# Patient Record
Sex: MALE | Race: WHITE | NOT HISPANIC OR LATINO | Employment: FULL TIME | URBAN - METROPOLITAN AREA
[De-identification: names, ages, dates, MRNs, and addresses within clinical notes are randomized per-mention and may not be internally consistent; named-entity substitution may affect disease eponyms.]

---

## 2017-02-21 ENCOUNTER — HOSPITAL ENCOUNTER (EMERGENCY)
Facility: CLINIC | Age: 22
Discharge: HOME | End: 2017-02-21
Attending: EMERGENCY MEDICINE | Admitting: EMERGENCY MEDICINE
Payer: COMMERCIAL

## 2017-02-21 VITALS
SYSTOLIC BLOOD PRESSURE: 106 MMHG | RESPIRATION RATE: 18 BRPM | BODY MASS INDEX: 24.05 KG/M2 | HEART RATE: 92 BPM | DIASTOLIC BLOOD PRESSURE: 73 MMHG | HEIGHT: 70 IN | OXYGEN SATURATION: 98 % | TEMPERATURE: 98.1 F | WEIGHT: 168 LBS

## 2017-02-21 DIAGNOSIS — J02.0 ACUTE STREPTOCOCCAL PHARYNGITIS: ICD-10-CM

## 2017-02-21 LAB
INTERNAL QC OK POCT: YES
S PYO AG THROAT QL IA.RAPID: POSITIVE

## 2017-02-21 PROCEDURE — 87880 STREP A ASSAY W/OPTIC: CPT | Performed by: EMERGENCY MEDICINE

## 2017-02-21 PROCEDURE — 99213 OFFICE O/P EST LOW 20 MIN: CPT | Performed by: EMERGENCY MEDICINE

## 2017-02-21 PROCEDURE — 99213 OFFICE O/P EST LOW 20 MIN: CPT

## 2017-02-21 RX ORDER — AMOXICILLIN 500 MG/1
1000 CAPSULE ORAL 2 TIMES DAILY
Qty: 40 CAPSULE | Refills: 0 | Status: SHIPPED | OUTPATIENT
Start: 2017-02-21 | End: 2017-03-03

## 2017-02-21 NOTE — ED PROVIDER NOTES
History     Chief Complaint   Patient presents with     Pharyngitis     HPI  Anthony Edwards is a 21 year old male who presents with 4 days of sore throat.  Patient also states one week ago he had influenza-like symptoms but had improvement with this.  His significant other also had similar symptoms.  He now presents with moderate sore throat.  He has some nasal congestion.  Denies ear pain.  He has a cough which is intermittent productive.  No chest pain or shortness of breath.  No abdominal pain, nausea or vomiting.  No treatment prior to arrival.  Former smoker.    I have reviewed the Medications, Allergies, Past Medical and Surgical History, and Social History in the Epic system.    Review of Systems all other systems reviewed and are negative  History reviewed. No pertinent past medical history.  Patient Active Problem List   Diagnosis     Atopic rhinitis     Attention deficit hyperactivity disorder (ADHD), combined type     Major depressive disorder, single episode, mild (H)     Stimulant dependence (H)     No current facility-administered medications for this encounter.      Current Outpatient Prescriptions   Medication     amoxicillin (AMOXIL) 500 MG capsule     citalopram (CELEXA) 20 MG tablet     ADDERALL XR 30 MG per 24 hr capsule     albuterol (PROAIR HFA, PROVENTIL HFA, VENTOLIN HFA) 108 (90 BASE) MCG/ACT inhaler     predniSONE (DELTASONE) 20 MG tablet     diclofenac (VOLTAREN) 50 MG EC tablet     cyclobenzaprine (FLEXERIL) 10 MG tablet     ibuprofen (ADVIL,MOTRIN) 200 MG tablet     fluticasone (FLONASE) 50 MCG/ACT nasal spray     olopatadine (PATANOL) 0.1 % ophthalmic solution     loratadine (CLARITIN) 10 MG tablet     montelukast (SINGULAIR) 10 MG tablet      No Known Allergies  Social History     Social History     Marital status: Single     Spouse name: N/A     Number of children: N/A     Years of education: N/A     Occupational History     Not on file.     Social History Main Topics      "Smoking status: Former Smoker     Smokeless tobacco: Not on file     Alcohol use No     Drug use: No     Sexual activity: Not on file     Other Topics Concern     Not on file     Social History Narrative     No family history on file.        Physical Exam   BP: 106/73  Pulse: 92  Temp: 98.1  F (36.7  C)  Resp: 18  Height: 177.8 cm (5' 10\")  Weight: 76.2 kg (168 lb)  SpO2: 98 %  Physical Exam Gen. alert cooperative male in mild to moderate chest.  HEENT reveals a right ear effusion but no infection.  Left TM is normal.  Nasal passages are swollen near occlusion with clear discharge.  Oral he has tonsillar hypertrophy without exudate.  Neck is supple with tender right anterior node.  No stridor.  Lungs were clear without adventitious sounds.  No skin rashes.    ED Course     ED Course     Procedures             Critical Care time:  none               Labs Ordered and Resulted from Time of ED Arrival Up to the Time of Departure from the ED - No data to display  Rapid strep is ordered.  Assessments & Plan (with Medical Decision Making)   Patient is 21-year-old male presents with 4 days of sore throat.  No significant congestion and cough.  Recent influenza-like illness.  On exam patient was afebrile and vitals stable.  Not hypoxic.  He did have symmetrical tonsillar hypertrophy or exudate.  Tender anterior node on the right.  No stridor.  No adventitious lung sounds.  A strep test was positive.  Patient tried amoxicillin for 10 days.  Handout on strep pharyngitis is provided.  Reasons to return for reassessment discussed.  I have reviewed the nursing notes.    I have reviewed the findings, diagnosis, plan and need for follow up with the patient.    New Prescriptions    AMOXICILLIN (AMOXIL) 500 MG CAPSULE    Take 2 capsules (1,000 mg) by mouth 2 times daily for 10 days       Final diagnoses:   Acute streptococcal pharyngitis       2/21/2017   [facility] EMERGENCY DEPARTMENT     Tim Erickson MD  02/21/17 1407    "

## 2017-02-21 NOTE — ED AVS SNAPSHOT
[facility] Emergency Department    [address] [address]    Phone:  [phone]    Fax:  [phone]                                       Anthony Edwards   MRN: [id number]    Department:  [facility] Emergency Department   Date of Visit:  2/21/2017           After Visit Summary Signature Page     I have received my discharge instructions, and my questions have been answered. I have discussed any challenges I see with this plan with the nurse or doctor.    ..........................................................................................................................................  Patient/Patient Representative Signature      ..........................................................................................................................................  Patient Representative Print Name and Relationship to Patient    ..................................................               ................................................  Date                                            Time    ..........................................................................................................................................  Reviewed by Signature/Title    ...................................................              ..............................................  Date                                                            Time

## 2017-02-21 NOTE — ED AVS SNAPSHOT
[facility] Emergency Department    [address]    [place] [address]    Phone:  [phone]    Fax:  [phone]                                       Anthony Edwards   MRN: [id number]    Department:  [facility] Emergency Department   Date of Visit:  2/21/2017           Patient Information     Date Of Birth          1995        Your diagnoses for this visit were:     Acute streptococcal pharyngitis        You were seen by Tim Erickson MD.      Follow-up Information     Follow up with Orville Eubanks PA-C.    Specialty:  Physician Assistant    Why:  As needed    Contact information:    [facility]  [address] [facility] [address]  [phone]          Discharge Instructions         Pharyngitis: Strep (Confirmed)     You have had a positive test for strep throat. Strep throat is a contagious illness. It is spread by coughing, kissing or by touching others after touching your mouth or nose. Symptoms include throat pain which is worse with swallowing, aching all over, headache and fever. It is treated with antibiotic medication. This should help you start to feel better within 1-2 days.  Home care    Rest at home. Drink plenty of fluids to avoid dehydration.    No work or school for the first 2 days of taking the antibiotics. After this time, you will not be contagious. You can then return to school or work if you are feeling better.     The antibiotic medication must be taken for the full 10 days, even if you feel better. This is very important to ensure the infection is treated. It is also important to prevent drug-resistent organisms from developing. If you were given an antibiotic shot, no more antibiotics are needed.    You may use acetaminophen (Tylenol) or ibuprofen (Motrin, Advil) to control pain or fever, unless another medicine was prescribed for this. (NOTE: If you have chronic liver or kidney disease or ever had a stomach ulcer or GI bleeding, talk with your  doctor before using these medicines.)    Throat lozenges or sprays (such as Chloraseptic) help reduce pain. Gargling with warm salt water will also reduce throat pain. Dissolve 1/2 teaspoon of salt in 1 glass of warm water. This may be useful just before meals.     Soft foods are okay. Avoid salty or spicy foods.  Follow-up care  Follow up with your healthcare provider or our staff if you are not improving over the next week.  When to seek medical advice  Call your healthcare provider right away if any of these occur:    Fever as directed by your doctor     New or worsening ear pain, sinus pain, or headache    Painful lumps in the back of neck    Stiff neck    Lymph nodes are getting larger or becoming soft in the middle    Inability to swallow liquids, excessive drooling, or inability to open mouth wide due to throat pain    Signs of dehydration (very dark urine or no urine, sunken eyes, dizziness)    Trouble breathing or noisy breathing    Muffled voice    New rash    3066-9091 [employer]. [address] [place] [address]. All rights reserved. This information is not intended as a substitute for professional medical care. Always follow your healthcare professional's instructions.          Future Appointments        Provider Department Dept Phone Center    2/23/2017 10:40 AM Orville Eubanks PA-C [facility] [phone] [facility]      24 Hour Appointment Hotline       To make an appointment at any [facility], call [phone] ([phone]). If you don't have a family doctor or clinic, we will help you find one. [facility] are conveniently located to serve the needs of you and your family.             Review of your medicines      START taking        Dose / Directions Last dose taken    amoxicillin 500 MG capsule   Commonly known as:  AMOXIL   Dose:  1000 mg   Quantity:  40 capsule        Take 2 capsules (1,000 mg) by mouth 2 times daily for 10 days   Refills:  0           Our records show that you are taking the medicines listed below. If these are incorrect, please call your family doctor or clinic.        Dose / Directions Last dose taken    ADDERALL XR 30 MG per 24 hr capsule   Dose:  30 mg   Quantity:  30 capsule   Generic drug:  amphetamine-dextroamphetamine        Take 1 capsule (30 mg) by mouth daily Need appointment for further refills   Refills:  0        albuterol 108 (90 BASE) MCG/ACT Inhaler   Commonly known as:  PROAIR HFA/PROVENTIL HFA/VENTOLIN HFA   Dose:  2 puff   Quantity:  1 Inhaler        Inhale 2 puffs into the lungs every 6 hours as needed for shortness of breath / dyspnea or wheezing   Refills:  0        citalopram 20 MG tablet   Commonly known as:  celeXA   Dose:  20 mg   Quantity:  30 tablet        Take 1 tablet (20 mg) by mouth daily   Refills:  1        cyclobenzaprine 10 MG tablet   Commonly known as:  FLEXERIL   Dose:  10 mg   Quantity:  60 tablet        Take 1 tablet (10 mg) by mouth 3 times daily as needed for muscle spasms   Refills:  1        diclofenac 50 MG EC tablet   Commonly known as:  VOLTAREN   Dose:  50 mg   Quantity:  90 tablet        Take 1 tablet (50 mg) by mouth 3 times daily as needed for moderate pain   Refills:  1        fluticasone 50 MCG/ACT spray   Commonly known as:  FLONASE   Dose:  2 spray   Quantity:  16 g        Spray 2 sprays in nostril daily   Refills:  11        ibuprofen 200 MG tablet   Commonly known as:  ADVIL/MOTRIN   Dose:  400 mg        Take 400 mg by mouth   Refills:  0        loratadine 10 MG tablet   Commonly known as:  CLARITIN   Dose:  10 mg   Quantity:  30 tablet        Take 1 tablet (10 mg) by mouth daily   Refills:  11        montelukast 10 MG tablet   Commonly known as:  SINGULAIR   Dose:  10 mg   Quantity:  30 tablet        Take 1 tablet (10 mg) by mouth At Bedtime   Refills:  11        olopatadine 0.1 % ophthalmic solution   Commonly known as:  PATANOL   Dose:  1 drop   Quantity:  1 Bottle         "Place 1 drop into both eyes 2 times daily   Refills:  11        predniSONE 20 MG tablet   Commonly known as:  DELTASONE   Quantity:  10 tablet        Take one tablet twice  Day for 5 days   Refills:  0                Prescriptions were sent or printed at these locations (1 Prescription)                   [facility] [address] [place] [address] - [address] [facility]   [address] [facility] [address]    Telephone:  [phone]   Fax:  [phone]   Hours:                  E-Prescribed (1 of 1)         amoxicillin (AMOXIL) 500 MG capsule                Orders Needing Specimen Collection     None      Pending Results     No orders found from 2017 to 2017.            Pending Culture Results     No orders found from 2017 to 2017.             Test Results from your hospital stay            Thank you for choosing [facility]       Thank you for choosing [facility] for your care. Our goal is always to provide you with excellent care. Hearing back from our patients is one way we can continue to improve our services. Please take a few minutes to complete the written survey that you may receive in the mail after you visit with us. Thank you!        [facility] Information     [facility] lets you send messages to your doctor, view your test results, renew your prescriptions, schedule appointments and more. To sign up, go to [email or web address] . Click on \"Log in\" on the left side of the screen, which will take you to the Welcome page. Then click on \"Sign up Now\" on the right side of the page.     You will be asked to enter the access code listed below, as well as some personal information. Please follow the directions to create your username and password.     Your access code is: [email or web address]  Expires: 2017  2:07 PM     Your access code will  in 90 days. If you need help or a new code, please call your [facility] clinic or [phone].        Care EveryWhere ID     This is your Care EveryWhere " ID. This could be used by other organizations to access your [place] medical records  [id number]        After Visit Summary       This is your record. Keep this with you and show to your community pharmacist(s) and doctor(s) at your next visit.

## 2017-02-21 NOTE — DISCHARGE INSTRUCTIONS
Pharyngitis: Strep (Confirmed)     You have had a positive test for strep throat. Strep throat is a contagious illness. It is spread by coughing, kissing or by touching others after touching your mouth or nose. Symptoms include throat pain which is worse with swallowing, aching all over, headache and fever. It is treated with antibiotic medication. This should help you start to feel better within 1-2 days.  Home care    Rest at home. Drink plenty of fluids to avoid dehydration.    No work or school for the first 2 days of taking the antibiotics. After this time, you will not be contagious. You can then return to school or work if you are feeling better.     The antibiotic medication must be taken for the full 10 days, even if you feel better. This is very important to ensure the infection is treated. It is also important to prevent drug-resistent organisms from developing. If you were given an antibiotic shot, no more antibiotics are needed.    You may use acetaminophen (Tylenol) or ibuprofen (Motrin, Advil) to control pain or fever, unless another medicine was prescribed for this. (NOTE: If you have chronic liver or kidney disease or ever had a stomach ulcer or GI bleeding, talk with your doctor before using these medicines.)    Throat lozenges or sprays (such as Chloraseptic) help reduce pain. Gargling with warm salt water will also reduce throat pain. Dissolve 1/2 teaspoon of salt in 1 glass of warm water. This may be useful just before meals.     Soft foods are okay. Avoid salty or spicy foods.  Follow-up care  Follow up with your healthcare provider or our staff if you are not improving over the next week.  When to seek medical advice  Call your healthcare provider right away if any of these occur:    Fever as directed by your doctor     New or worsening ear pain, sinus pain, or headache    Painful lumps in the back of neck    Stiff neck    Lymph nodes are getting larger or becoming soft in the  middle    Inability to swallow liquids, excessive drooling, or inability to open mouth wide due to throat pain    Signs of dehydration (very dark urine or no urine, sunken eyes, dizziness)    Trouble breathing or noisy breathing    Muffled voice    New rash    7039-2795 [employer]. [address] [place] [address]. All rights reserved. This information is not intended as a substitute for professional medical care. Always follow your healthcare professional's instructions.

## 2017-02-27 ENCOUNTER — OFFICE VISIT (OUTPATIENT)
Dept: FAMILY MEDICINE | Facility: CLINIC | Age: 22
End: 2017-02-27
Payer: COMMERCIAL

## 2017-02-27 VITALS
BODY MASS INDEX: 23.68 KG/M2 | TEMPERATURE: 97.7 F | SYSTOLIC BLOOD PRESSURE: 110 MMHG | WEIGHT: 165 LBS | DIASTOLIC BLOOD PRESSURE: 66 MMHG | HEART RATE: 72 BPM

## 2017-02-27 DIAGNOSIS — F90.2 ATTENTION DEFICIT HYPERACTIVITY DISORDER (ADHD), COMBINED TYPE: ICD-10-CM

## 2017-02-27 DIAGNOSIS — F32.0 MAJOR DEPRESSIVE DISORDER, SINGLE EPISODE, MILD (H): ICD-10-CM

## 2017-02-27 DIAGNOSIS — G44.209 TENSION HEADACHE: Primary | ICD-10-CM

## 2017-02-27 PROCEDURE — 99214 OFFICE O/P EST MOD 30 MIN: CPT | Performed by: PHYSICIAN ASSISTANT

## 2017-02-27 RX ORDER — CITALOPRAM HYDROBROMIDE 20 MG/1
20 TABLET ORAL DAILY
Qty: 90 TABLET | Refills: 3 | Status: SHIPPED | OUTPATIENT
Start: 2017-02-27 | End: 2019-04-03

## 2017-02-27 RX ORDER — KETOROLAC TROMETHAMINE 10 MG/1
10 TABLET, FILM COATED ORAL EVERY 6 HOURS PRN
Qty: 20 TABLET | Refills: 0 | Status: SHIPPED | OUTPATIENT
Start: 2017-02-27 | End: 2017-06-05

## 2017-02-27 RX ORDER — DEXTROAMPHETAMINE SULFATE, DEXTROAMPHETAMINE SACCHARATE, AMPHETAMINE SULFATE AND AMPHETAMINE ASPARTATE 7.5; 7.5; 7.5; 7.5 MG/1; MG/1; MG/1; MG/1
30 CAPSULE, EXTENDED RELEASE ORAL DAILY
Qty: 30 CAPSULE | Refills: 0 | Status: SHIPPED | OUTPATIENT
Start: 2017-02-27 | End: 2017-02-27

## 2017-02-27 RX ORDER — DEXTROAMPHETAMINE SULFATE, DEXTROAMPHETAMINE SACCHARATE, AMPHETAMINE SULFATE AND AMPHETAMINE ASPARTATE 7.5; 7.5; 7.5; 7.5 MG/1; MG/1; MG/1; MG/1
30 CAPSULE, EXTENDED RELEASE ORAL DAILY
Qty: 30 CAPSULE | Refills: 0 | Status: SHIPPED | OUTPATIENT
Start: 2017-03-28 | End: 2017-02-27

## 2017-02-27 RX ORDER — DEXTROAMPHETAMINE SULFATE, DEXTROAMPHETAMINE SACCHARATE, AMPHETAMINE SULFATE AND AMPHETAMINE ASPARTATE 7.5; 7.5; 7.5; 7.5 MG/1; MG/1; MG/1; MG/1
30 CAPSULE, EXTENDED RELEASE ORAL DAILY
Qty: 30 CAPSULE | Refills: 0 | Status: SHIPPED | OUTPATIENT
Start: 2017-04-27 | End: 2017-06-05

## 2017-02-27 ASSESSMENT — ENCOUNTER SYMPTOMS
DIZZINESS: 0
EYE DISCHARGE: 0
HEADACHES: 1
BACK PAIN: 0
SORE THROAT: 0
MYALGIAS: 0
COUGH: 0
INSOMNIA: 0
NAUSEA: 0
FREQUENCY: 0
EYE PAIN: 0
HEMOPTYSIS: 0
DIARRHEA: 0
BLURRED VISION: 0
DYSURIA: 0
CONSTIPATION: 0
FOCAL WEAKNESS: 0
BLOOD IN STOOL: 0
DEPRESSION: 1
VOMITING: 0
WEAKNESS: 0
DOUBLE VISION: 0
FEVER: 0
WEIGHT LOSS: 0
HALLUCINATIONS: 0
PALPITATIONS: 0
SPUTUM PRODUCTION: 0
LOSS OF CONSCIOUSNESS: 0
NECK PAIN: 0
ABDOMINAL PAIN: 0
SENSORY CHANGE: 0
HEARTBURN: 0
SEIZURES: 0
NERVOUS/ANXIOUS: 1
DIAPHORESIS: 0
EYE REDNESS: 0
SHORTNESS OF BREATH: 0
TINGLING: 0
WHEEZING: 0
PHOTOPHOBIA: 0
ORTHOPNEA: 0

## 2017-02-27 ASSESSMENT — LIFESTYLE VARIABLES: SUBSTANCE_ABUSE: 0

## 2017-02-27 NOTE — PROGRESS NOTES
HPI      SUBJECTIVE:                                                    Anthony Edwards is a 21 year old male who presents to clinic today for follow-up of his depression and anxiety as well as. He is doing very well on his current medication and having no side effects or problems. He has recently had headaches but is also recovering from strep throat.      Medication Followup of Adderall    Taking Medication as prescribed: yes    Side Effects:  None    Medication Helping Symptoms:  yes       Depression Followup    Status since last visit: Stable on current dose    See PHQ-9 for current symptoms.  Other associated symptoms: None    Complicating factors:   Significant life event:  No   Current substance abuse:  None  Anxiety or Panic symptoms:  No    PHQ-9  English PHQ-9   Any Language            Problem list and histories reviewed & adjusted, as indicated.  Additional history: as documented    Patient Active Problem List   Diagnosis     Atopic rhinitis     Attention deficit hyperactivity disorder (ADHD), combined type     Major depressive disorder, single episode, mild (H)     Stimulant dependence (H)     History reviewed. No pertinent past surgical history.    Social History   Substance Use Topics     Smoking status: Current Some Day Smoker     Smokeless tobacco: Not on file     Alcohol use No     History reviewed. No pertinent family history.      Current Outpatient Prescriptions   Medication Sig Dispense Refill     citalopram (CELEXA) 20 MG tablet Take 1 tablet (20 mg) by mouth daily 90 tablet 3     [START ON 4/27/2017] ADDERALL XR 30 MG per 24 hr capsule Take 1 capsule (30 mg) by mouth daily 30 capsule 0     ketorolac (TORADOL) 10 MG tablet Take 1 tablet (10 mg) by mouth every 6 hours as needed 20 tablet 0     amoxicillin (AMOXIL) 500 MG capsule Take 2 capsules (1,000 mg) by mouth 2 times daily for 10 days 40 capsule 0     albuterol (PROAIR HFA, PROVENTIL HFA, VENTOLIN HFA) 108 (90 BASE) MCG/ACT inhaler  Inhale 2 puffs into the lungs every 6 hours as needed for shortness of breath / dyspnea or wheezing 1 Inhaler 0     cyclobenzaprine (FLEXERIL) 10 MG tablet Take 1 tablet (10 mg) by mouth 3 times daily as needed for muscle spasms 60 tablet 1     ibuprofen (ADVIL,MOTRIN) 200 MG tablet Take 400 mg by mouth       fluticasone (FLONASE) 50 MCG/ACT nasal spray Spray 2 sprays in nostril daily 16 g 11     olopatadine (PATANOL) 0.1 % ophthalmic solution Place 1 drop into both eyes 2 times daily 1 Bottle 11     loratadine (CLARITIN) 10 MG tablet Take 1 tablet (10 mg) by mouth daily 30 tablet 11     montelukast (SINGULAIR) 10 MG tablet Take 1 tablet (10 mg) by mouth At Bedtime 30 tablet 11     [DISCONTINUED] citalopram (CELEXA) 20 MG tablet Take 1 tablet (20 mg) by mouth daily 30 tablet 1     No Known Allergies  Problem list, Medication list, Allergies, and Medical/Social/Surgical histories [facility] and updated as appropriate.          Review of Systems   Constitutional: Negative for diaphoresis, fever, malaise/fatigue and weight loss.   HENT: Negative for congestion, ear discharge, ear pain, hearing loss, nosebleeds and sore throat.    Eyes: Negative for blurred vision, double vision, photophobia, pain, discharge and redness.   Respiratory: Negative for cough, hemoptysis, sputum production, shortness of breath and wheezing.    Cardiovascular: Negative for chest pain, palpitations, orthopnea and leg swelling.   Gastrointestinal: Negative for abdominal pain, blood in stool, constipation, diarrhea, heartburn, melena, nausea and vomiting.   Genitourinary: Negative.  Negative for dysuria, frequency and urgency.   Musculoskeletal: Negative for back pain, joint pain, myalgias and neck pain.   Skin: Negative for itching and rash.   Neurological: Positive for headaches. Negative for dizziness, tingling, sensory change, focal weakness, seizures, loss of consciousness and weakness.   Endo/Heme/Allergies: Negative.     Psychiatric/Behavioral: Positive for depression. Negative for hallucinations, substance abuse and suicidal ideas. The patient is nervous/anxious. The patient does not have insomnia.          Physical Exam   Constitutional: He is oriented to person, place, and time and well-developed, well-nourished, and in no distress.   HENT:   Head: Normocephalic and atraumatic.   Right Ear: External ear normal.   Left Ear: External ear normal.   Nose: Nose normal.   Mouth/Throat: Oropharynx is clear and moist.   Eyes: Conjunctivae and EOM are normal. Pupils are equal, round, and reactive to light. Right eye exhibits no discharge. Left eye exhibits no discharge. No scleral icterus.   Neck: Normal range of motion. Neck supple. No thyromegaly present.   Cardiovascular: Normal rate, regular rhythm, normal heart sounds and intact distal pulses.  Exam reveals no gallop and no friction rub.    No murmur heard.  Pulmonary/Chest: Effort normal and breath sounds normal. No respiratory distress. He has no wheezes. He has no rales. He exhibits no tenderness.   Abdominal: Soft. Bowel sounds are normal. He exhibits no distension and no mass. There is no tenderness. There is no rebound and no guarding.   Musculoskeletal: Normal range of motion. He exhibits no edema or tenderness.   Lymphadenopathy:     He has no cervical adenopathy.   Neurological: He is alert and oriented to person, place, and time. He has normal reflexes. No cranial nerve deficit. He exhibits normal muscle tone. Gait normal. Coordination normal.   Skin: Skin is warm and dry. No rash noted. No erythema.   Psychiatric: Mood, memory, affect and judgment normal. His mood appears not anxious. He does not exhibit a depressed mood. He expresses no homicidal and no suicidal ideation. He expresses no suicidal plans and no homicidal plans.         (G44.259) Tension headache  (primary encounter diagnosis)  Comment:   Plan: ketorolac (TORADOL) 10 MG tablet            (F90.2) Attention  deficit hyperactivity disorder (ADHD), combined type  Comment:   Plan: ADDERALL XR 30 MG per 24 hr capsule,         DISCONTINUED: ADDERALL XR 30 MG per 24 hr         capsule, DISCONTINUED: ADDERALL XR 30 MG per 24        hr capsule, DISCONTINUED: ADDERALL XR 30 MG per        24 hr capsule            (F32.0) Major depressive disorder, single episode, mild (H)  Comment:  Plan: citalopram (CELEXA) 20 MG tablet          He'll continue current Adderall and citalopram dosage as he is very stable on these medications.    For his headaches we will use a short course of Toradol to see if that will stop them and if they are persistent further evaluation will be recommended.

## 2017-02-27 NOTE — MR AVS SNAPSHOT
"              After Visit Summary   2/27/2017    Anthony Edwards    MRN: [id number]           Patient Information     Date Of Birth          1995        Visit Information        Provider Department      2/27/2017 10:00 AM Orville Eubanks PA-C [facility]        Today's Diagnoses     Tension headache    -  1    Attention deficit hyperactivity disorder (ADHD), combined type        Major depressive disorder, single episode, mild (H)           Follow-ups after your visit        Follow-up notes from your care team     Return in about 3 months (around 5/27/2017), or if symptoms worsen or fail to improve.      Who to contact     If you have questions or need follow up information about today's clinic visit or your schedule please contact [facility] directly at [phone].  Normal or non-critical lab and imaging results will be communicated to you by MyChart, letter or phone within 4 business days after the clinic has received the results. If you do not hear from us within 7 days, please contact the clinic through MyChart or phone. If you have a critical or abnormal lab result, we will notify you by phone as soon as possible.  Submit refill requests through [employer] or call your pharmacy and they will forward the refill request to us. Please allow 3 business days for your refill to be completed.          Additional Information About Your Visit        MyChart Information     [employer] lets you send messages to your doctor, view your test results, renew your prescriptions, schedule appointments and more. To sign up, go to [email or web address] . Click on \"Log in\" on the left side of the screen, which will take you to the Welcome page. Then click on \"Sign up Now\" on the right side of the page.     You will be asked to enter the access code listed below, as well as some personal information. Please follow the directions to create your username and password.     Your access code " is: [email or web address]  Expires: 2017  2:07 PM     Your access code will  in 90 days. If you need help or a new code, please call your [facility] or [phone].        Care EveryWhere ID     This is your Care EveryWhere ID. This could be used by other organizations to access your [facility] medical records  [id number]        Your Vitals Were     Pulse Temperature BMI (Body Mass Index)             72 97.7  F (36.5  C) (Tympanic) 23.68 kg/m2          Blood Pressure from Last 3 Encounters:   17 110/66   17 106/73   16 126/58    Weight from Last 3 Encounters:   17 165 lb (74.8 kg) (66 %)*   17 168 lb (76.2 kg) (70 %)*   16 182 lb (82.6 kg) (84 %)*     * Growth percentiles are based on [facility] 2-20 Years data.              Today, you had the following     No orders found for display         Today's Medication Changes          These changes are accurate as of: 17 10:49 AM.  If you have any questions, ask your nurse or doctor.               Start taking these medicines.        Dose/Directions    ADDERALL XR 30 MG per 24 hr capsule   Used for:  Attention deficit hyperactivity disorder (ADHD), combined type   Generic drug:  amphetamine-dextroamphetamine   Started by:  Orville Eubanks PA-C        Dose:  30 mg   Start taking on:  2017   Take 1 capsule (30 mg) by mouth daily   Quantity:  30 capsule   Refills:  0       ketorolac 10 MG tablet   Commonly known as:  TORADOL   Used for:  Tension headache   Started by:  Orville Eubanks PA-C        Dose:  10 mg   Take 1 tablet (10 mg) by mouth every 6 hours as needed   Quantity:  20 tablet   Refills:  0            Where to get your medicines      These medications were sent to [facility] [address] [place] [address] [address] [facility]  [address] [facility] [address]     Phone:  [phone]     citalopram 20 MG tablet    ketorolac 10 MG tablet         Some of these will need a paper prescription and others can be  bought over the counter.  Ask your nurse if you have questions.     Bring a paper prescription for each of these medications     ADDERALL XR 30 MG per 24 hr capsule                Primary Care Provider Office Phone # Fax #    Orville Eubanks PA-C [phone]       [facility] [address] [facility] [address]        Thank you!     Thank you for choosing [facility]  for your care. Our goal is always to provide you with excellent care. Hearing back from our patients is one way we can continue to improve our services. Please take a few minutes to complete the written survey that you may receive in the mail after your visit with us. Thank you!             Your Updated Medication List - Protect others around you: Learn how to safely use, store and throw away your medicines at www.disposemymeds.org.          This list is accurate as of: 2/27/17 10:49 AM.  Always use your most recent med list.                   Brand Name Dispense Instructions for use    ADDERALL XR 30 MG per 24 hr capsule   Generic drug:  amphetamine-dextroamphetamine   Start taking on:  4/27/2017     30 capsule    Take 1 capsule (30 mg) by mouth daily       albuterol 108 (90 BASE) MCG/ACT Inhaler    PROAIR HFA/PROVENTIL HFA/VENTOLIN HFA    1 Inhaler    Inhale 2 puffs into the lungs every 6 hours as needed for shortness of breath / dyspnea or wheezing       amoxicillin 500 MG capsule    AMOXIL    40 capsule    Take 2 capsules (1,000 mg) by mouth 2 times daily for 10 days       citalopram 20 MG tablet    celeXA    90 tablet    Take 1 tablet (20 mg) by mouth daily       cyclobenzaprine 10 MG tablet    FLEXERIL    60 tablet    Take 1 tablet (10 mg) by mouth 3 times daily as needed for muscle spasms       fluticasone 50 MCG/ACT spray    FLONASE    16 g    Spray 2 sprays in nostril daily       ibuprofen 200 MG tablet    ADVIL/MOTRIN     Take 400 mg by mouth       ketorolac 10 MG tablet    TORADOL    20 tablet     Take 1 tablet (10 mg) by mouth every 6 hours as needed       loratadine 10 MG tablet    CLARITIN    30 tablet    Take 1 tablet (10 mg) by mouth daily       montelukast 10 MG tablet    SINGULAIR    30 tablet    Take 1 tablet (10 mg) by mouth At Bedtime       olopatadine 0.1 % ophthalmic solution    PATANOL    1 Bottle    Place 1 drop into both eyes 2 times daily

## 2017-02-27 NOTE — NURSING NOTE
"Chief Complaint   Patient presents with     Recheck Medication       Initial /66 (BP Location: Right arm, Patient Position: Chair, Cuff Size: Adult Regular)  Pulse 72  Temp 97.7  F (36.5  C) (Tympanic)  Wt 165 lb (74.8 kg)  BMI 23.68 kg/m2 Estimated body mass index is 23.68 kg/(m^2) as calculated from the following:    Height as of 2/21/17: 5' 10\" (1.778 m).    Weight as of this encounter: 165 lb (74.8 kg).  Medication Reconciliation: complete    Health Maintenance that is potentially due pending provider review:  NONE    Vivian Lay MA  10:14 AM 2/27/2017  .    "

## 2017-04-30 ENCOUNTER — APPOINTMENT (OUTPATIENT)
Dept: GENERAL RADIOLOGY | Facility: CLINIC | Age: 22
End: 2017-04-30
Attending: STUDENT IN AN ORGANIZED HEALTH CARE EDUCATION/TRAINING PROGRAM
Payer: COMMERCIAL

## 2017-04-30 ENCOUNTER — HOSPITAL ENCOUNTER (EMERGENCY)
Facility: CLINIC | Age: 22
Discharge: HOME | End: 2017-04-30
Attending: STUDENT IN AN ORGANIZED HEALTH CARE EDUCATION/TRAINING PROGRAM | Admitting: STUDENT IN AN ORGANIZED HEALTH CARE EDUCATION/TRAINING PROGRAM
Payer: COMMERCIAL

## 2017-04-30 VITALS
TEMPERATURE: 98.2 F | WEIGHT: 175 LBS | HEIGHT: 71 IN | BODY MASS INDEX: 24.5 KG/M2 | DIASTOLIC BLOOD PRESSURE: 47 MMHG | OXYGEN SATURATION: 97 % | RESPIRATION RATE: 16 BRPM | SYSTOLIC BLOOD PRESSURE: 104 MMHG

## 2017-04-30 DIAGNOSIS — M54.50 ACUTE LEFT-SIDED LOW BACK PAIN WITHOUT SCIATICA: ICD-10-CM

## 2017-04-30 DIAGNOSIS — S16.1XXA NECK STRAIN, INITIAL ENCOUNTER: ICD-10-CM

## 2017-04-30 DIAGNOSIS — S63.501A RIGHT WRIST SPRAIN, INITIAL ENCOUNTER: ICD-10-CM

## 2017-04-30 LAB
ALBUMIN SERPL-MCNC: 4 G/DL (ref 3.4–5)
ALBUMIN UR-MCNC: 10 MG/DL
ALP SERPL-CCNC: 88 U/L (ref 65–260)
ALT SERPL W P-5'-P-CCNC: 27 U/L (ref 0–50)
AMORPH CRY #/AREA URNS HPF: ABNORMAL /HPF
ANION GAP SERPL CALCULATED.3IONS-SCNC: 11 MMOL/L (ref 3–14)
APPEARANCE UR: CLEAR
AST SERPL W P-5'-P-CCNC: 8 U/L (ref 0–35)
BASOPHILS # BLD AUTO: 0.1 10E9/L (ref 0–0.2)
BASOPHILS NFR BLD AUTO: 0.6 %
BILIRUB SERPL-MCNC: 0.2 MG/DL (ref 0.2–1.3)
BILIRUB UR QL STRIP: NEGATIVE
BUN SERPL-MCNC: 15 MG/DL (ref 7–30)
CALCIUM SERPL-MCNC: 9 MG/DL (ref 8.5–10.1)
CHLORIDE SERPL-SCNC: 107 MMOL/L (ref 98–110)
CO2 SERPL-SCNC: 26 MMOL/L (ref 20–32)
COLOR UR AUTO: YELLOW
CREAT SERPL-MCNC: 0.87 MG/DL (ref 0.5–1)
DIFFERENTIAL METHOD BLD: NORMAL
EOSINOPHIL # BLD AUTO: 0.5 10E9/L (ref 0–0.7)
EOSINOPHIL NFR BLD AUTO: 6 %
ERYTHROCYTE [DISTWIDTH] IN BLOOD BY AUTOMATED COUNT: 13.5 % (ref 10–15)
GFR SERPL CREATININE-BSD FRML MDRD: ABNORMAL ML/MIN/1.7M2
GLUCOSE SERPL-MCNC: 103 MG/DL (ref 70–99)
GLUCOSE UR STRIP-MCNC: NEGATIVE MG/DL
HCT VFR BLD AUTO: 43.8 % (ref 40–53)
HGB BLD-MCNC: 14.5 G/DL (ref 13.3–17.7)
HGB UR QL STRIP: NEGATIVE
IMM GRANULOCYTES # BLD: 0 10E9/L (ref 0–0.4)
IMM GRANULOCYTES NFR BLD: 0.1 %
KETONES UR STRIP-MCNC: NEGATIVE MG/DL
LEUKOCYTE ESTERASE UR QL STRIP: NEGATIVE
LIPASE SERPL-CCNC: 148 U/L (ref 73–393)
LYMPHOCYTES # BLD AUTO: 2.8 10E9/L (ref 0.8–5.3)
LYMPHOCYTES NFR BLD AUTO: 35.3 %
MCH RBC QN AUTO: 30.7 PG (ref 26.5–33)
MCHC RBC AUTO-ENTMCNC: 33.1 G/DL (ref 31.5–36.5)
MCV RBC AUTO: 93 FL (ref 78–100)
MONOCYTES # BLD AUTO: 0.9 10E9/L (ref 0–1.3)
MONOCYTES NFR BLD AUTO: 11.6 %
MUCOUS THREADS #/AREA URNS LPF: PRESENT /LPF
NEUTROPHILS # BLD AUTO: 3.7 10E9/L (ref 1.6–8.3)
NEUTROPHILS NFR BLD AUTO: 46.4 %
NITRATE UR QL: NEGATIVE
PH UR STRIP: 6 PH (ref 5–7)
PLATELET # BLD AUTO: 187 10E9/L (ref 150–450)
POTASSIUM SERPL-SCNC: 3.6 MMOL/L (ref 3.4–5.3)
PROT SERPL-MCNC: 7.3 G/DL (ref 6.8–8.8)
RBC # BLD AUTO: 4.72 10E12/L (ref 4.4–5.9)
RBC #/AREA URNS AUTO: 1 /HPF (ref 0–2)
SODIUM SERPL-SCNC: 144 MMOL/L (ref 133–144)
SP GR UR STRIP: 1.03 (ref 1–1.03)
URN SPEC COLLECT METH UR: ABNORMAL
UROBILINOGEN UR STRIP-MCNC: NORMAL MG/DL (ref 0–2)
WBC # BLD AUTO: 8.1 10E9/L (ref 4–11)
WBC #/AREA URNS AUTO: 1 /HPF (ref 0–2)

## 2017-04-30 PROCEDURE — 72040 X-RAY EXAM NECK SPINE 2-3 VW: CPT

## 2017-04-30 PROCEDURE — 99285 EMERGENCY DEPT VISIT HI MDM: CPT | Mod: 25

## 2017-04-30 PROCEDURE — 76705 ECHO EXAM OF ABDOMEN: CPT

## 2017-04-30 PROCEDURE — 25000128 H RX IP 250 OP 636: Performed by: STUDENT IN AN ORGANIZED HEALTH CARE EDUCATION/TRAINING PROGRAM

## 2017-04-30 PROCEDURE — 99284 EMERGENCY DEPT VISIT MOD MDM: CPT | Mod: 25 | Performed by: STUDENT IN AN ORGANIZED HEALTH CARE EDUCATION/TRAINING PROGRAM

## 2017-04-30 PROCEDURE — 71101 X-RAY EXAM UNILAT RIBS/CHEST: CPT | Mod: RT

## 2017-04-30 PROCEDURE — 83690 ASSAY OF LIPASE: CPT | Performed by: STUDENT IN AN ORGANIZED HEALTH CARE EDUCATION/TRAINING PROGRAM

## 2017-04-30 PROCEDURE — 25000132 ZZH RX MED GY IP 250 OP 250 PS 637: Performed by: STUDENT IN AN ORGANIZED HEALTH CARE EDUCATION/TRAINING PROGRAM

## 2017-04-30 PROCEDURE — 80053 COMPREHEN METABOLIC PANEL: CPT | Performed by: STUDENT IN AN ORGANIZED HEALTH CARE EDUCATION/TRAINING PROGRAM

## 2017-04-30 PROCEDURE — 81001 URINALYSIS AUTO W/SCOPE: CPT | Performed by: STUDENT IN AN ORGANIZED HEALTH CARE EDUCATION/TRAINING PROGRAM

## 2017-04-30 PROCEDURE — 73110 X-RAY EXAM OF WRIST: CPT | Mod: RT

## 2017-04-30 PROCEDURE — 25000125 ZZHC RX 250: Performed by: STUDENT IN AN ORGANIZED HEALTH CARE EDUCATION/TRAINING PROGRAM

## 2017-04-30 PROCEDURE — 76705 ECHO EXAM OF ABDOMEN: CPT | Mod: 26 | Performed by: STUDENT IN AN ORGANIZED HEALTH CARE EDUCATION/TRAINING PROGRAM

## 2017-04-30 PROCEDURE — 96374 THER/PROPH/DIAG INJ IV PUSH: CPT

## 2017-04-30 PROCEDURE — 85025 COMPLETE CBC W/AUTO DIFF WBC: CPT | Performed by: STUDENT IN AN ORGANIZED HEALTH CARE EDUCATION/TRAINING PROGRAM

## 2017-04-30 RX ORDER — OXYCODONE HYDROCHLORIDE 5 MG/1
5 TABLET ORAL ONCE
Status: COMPLETED | OUTPATIENT
Start: 2017-04-30 | End: 2017-04-30

## 2017-04-30 RX ORDER — OXYCODONE AND ACETAMINOPHEN 5; 325 MG/1; MG/1
1-2 TABLET ORAL EVERY 6 HOURS PRN
Qty: 10 TABLET | Refills: 0 | Status: SHIPPED | OUTPATIENT
Start: 2017-04-30 | End: 2018-04-05

## 2017-04-30 RX ORDER — KETOROLAC TROMETHAMINE 30 MG/ML
15 INJECTION, SOLUTION INTRAMUSCULAR; INTRAVENOUS ONCE
Status: COMPLETED | OUTPATIENT
Start: 2017-04-30 | End: 2017-04-30

## 2017-04-30 RX ORDER — OXYCODONE AND ACETAMINOPHEN 5; 325 MG/1; MG/1
1-2 TABLET ORAL EVERY 6 HOURS PRN
Status: DISCONTINUED | OUTPATIENT
Start: 2017-04-30 | End: 2017-04-30 | Stop reason: HOSPADM

## 2017-04-30 RX ORDER — ONDANSETRON 4 MG/1
4 TABLET, ORALLY DISINTEGRATING ORAL ONCE
Status: COMPLETED | OUTPATIENT
Start: 2017-04-30 | End: 2017-04-30

## 2017-04-30 RX ADMIN — ONDANSETRON 4 MG: 4 TABLET, ORALLY DISINTEGRATING ORAL at 05:44

## 2017-04-30 RX ADMIN — KETOROLAC TROMETHAMINE 15 MG: 30 INJECTION, SOLUTION INTRAMUSCULAR at 05:48

## 2017-04-30 RX ADMIN — OXYCODONE HYDROCHLORIDE 5 MG: 5 TABLET ORAL at 05:45
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
## 2017-04-30 NOTE — ED AVS SNAPSHOT
[facility] Emergency Department    [address] [facility] [address]    Phone:  [phone]    Fax:  [phone]                                       Anthony Edwards   MRN: [id number]    Department:  [facility] Emergency Department   Date of Visit:  4/30/2017           After Visit Summary Signature Page     I have received my discharge instructions, and my questions have been answered. I have discussed any challenges I see with this plan with the nurse or doctor.    ..........................................................................................................................................  Patient/Patient Representative Signature      ..........................................................................................................................................  Patient Representative Print Name and Relationship to Patient    ..................................................               ................................................  Date                                            Time    ..........................................................................................................................................  Reviewed by Signature/Title    ...................................................              ..............................................  Date                                                            Time

## 2017-04-30 NOTE — ED NOTES
Having right flank pain into right lateral lower chest and lateral upper abdominal pain and right wrist pain, and neck pain.  Patient doesn't remember if he hit his head.  patient fell skateboarding on a half pipe landing on a pipe on top about 1300 yesterday.

## 2017-04-30 NOTE — ED AVS SNAPSHOT
[facility] Emergency Department    [address] [facility] [address]    Phone:  [phone]    Fax:  [phone]                                       Anthony Edwards   MRN: [id number]    Department:  [facility] Emergency Department   Date of Visit:  4/30/2017           Patient Information     Date Of Birth          1995        Your diagnoses for this visit were:     Right wrist sprain, initial encounter     Neck strain, initial encounter     Acute left-sided low back pain without sciatica        You were seen by Denys Rosado DO.      Follow-up Information     Follow up with Orville Eubanks PA-C. Schedule an appointment as soon as possible for a visit in 5 days.    Specialty:  Physician Assistant    Why:  Followup for reevaluation if symptoms persist.    Contact information:    [address] [id number] [phone]        Discharge References/Attachments     RIB CONTUSION OR MINOR FRACTURE (ENGLISH)    WHIPLASH (ENGLISH)    WRIST SPRAIN (ENGLISH)      24 Hour Appointment Hotline       To make an appointment at any [facility], call [phone] ([phone]). If you don't have a family doctor or clinic, we will help you find one. [facility] clinics are conveniently located to serve the needs of you and your family.          ED Discharge Orders     [employer]                    Review of your medicines      START taking        Dose / Directions Last dose taken    oxyCODONE-acetaminophen 5-325 MG per tablet   Commonly known as:  PERCOCET   Dose:  1-2 tablet   Quantity:  10 tablet        Take 1-2 tablets by mouth every 6 hours as needed for moderate to severe pain   Refills:  0          Our records show that you are taking the medicines listed below. If these are incorrect, please call your family doctor or clinic.        Dose / Directions Last dose taken    ADDERALL XR 30 MG per 24 hr capsule   Dose:  30 mg   Quantity:  30  capsule   Generic drug:  amphetamine-dextroamphetamine        Take 1 capsule (30 mg) by mouth daily   Refills:  0        albuterol 108 (90 BASE) MCG/ACT Inhaler   Commonly known as:  PROAIR HFA/PROVENTIL HFA/VENTOLIN HFA   Dose:  2 puff   Quantity:  1 Inhaler        Inhale 2 puffs into the lungs every 6 hours as needed for shortness of breath / dyspnea or wheezing   Refills:  0        citalopram 20 MG tablet   Commonly known as:  celeXA   Dose:  20 mg   Quantity:  90 tablet        Take 1 tablet (20 mg) by mouth daily   Refills:  3        cyclobenzaprine 10 MG tablet   Commonly known as:  FLEXERIL   Dose:  10 mg   Quantity:  60 tablet        Take 1 tablet (10 mg) by mouth 3 times daily as needed for muscle spasms   Refills:  1        fluticasone 50 MCG/ACT spray   Commonly known as:  FLONASE   Dose:  2 spray   Quantity:  16 g        Spray 2 sprays in nostril daily   Refills:  11        ibuprofen 200 MG tablet   Commonly known as:  ADVIL/MOTRIN   Dose:  400 mg        Take 400 mg by mouth   Refills:  0        ketorolac 10 MG tablet   Commonly known as:  TORADOL   Dose:  10 mg   Quantity:  20 tablet        Take 1 tablet (10 mg) by mouth every 6 hours as needed   Refills:  0        loratadine 10 MG tablet   Commonly known as:  CLARITIN   Dose:  10 mg   Quantity:  30 tablet        Take 1 tablet (10 mg) by mouth daily   Refills:  11        montelukast 10 MG tablet   Commonly known as:  SINGULAIR   Dose:  10 mg   Quantity:  30 tablet        Take 1 tablet (10 mg) by mouth At Bedtime   Refills:  11        olopatadine 0.1 % ophthalmic solution   Commonly known as:  PATANOL   Dose:  1 drop   Quantity:  1 Bottle        Place 1 drop into both eyes 2 times daily   Refills:  11                Prescriptions were sent or printed at these locations (1 Prescription)                   Other Prescriptions                Printed at Department/Unit printer (1 of 1)         oxyCODONE-acetaminophen (PERCOCET) 5-325 MG per tablet                 Procedures and tests performed during your visit     CBC with platelets, differential    Cervical spine XR, 2-3 views    Comprehensive metabolic panel    Lipase    POC US ABDOMEN LIMITED    Ribs XR, unilat 3 views + PA chest, right    UA with Microscopic    Wrist XR, G/E 3 views, right      Orders Needing Specimen Collection     None      Pending Results     No orders found from 4/28/2017 to 5/1/2017.            Pending Culture Results     No orders found from 4/28/2017 to 5/1/2017.            Test Results From Your Hospital Stay        4/30/2017  5:21 AM      Component Results     Component Value Ref Range & Units Status    Color Urine Yellow  Final    Appearance Urine Clear  Final    Glucose Urine Negative NEG mg/dL Final    Bilirubin Urine Negative NEG Final    Ketones Urine Negative NEG mg/dL Final    Specific Gravity Urine 1.027 1.003 - 1.035 Final    Blood Urine Negative NEG Final    pH Urine 6.0 5.0 - 7.0 pH Final    Protein Albumin Urine 10 (A) NEG mg/dL Final    Urobilinogen mg/dL Normal 0.0 - 2.0 mg/dL Final    Nitrite Urine Negative NEG Final    Leukocyte Esterase Urine Negative NEG Final    Source Midstream Urine  Final    WBC Urine 1 0 - 2 /HPF Final    RBC Urine 1 0 - 2 /HPF Final    Mucous Urine Present (A) NEG /LPF Final    Amorphous Crystals Few (A) NEG /HPF Final         4/30/2017  5:54 AM      Component Results     Component Value Ref Range & Units Status    WBC 8.1 4.0 - 11.0 10e9/L Final    RBC Count 4.72 4.4 - 5.9 10e12/L Final    Hemoglobin 14.5 13.3 - 17.7 g/dL Final    Hematocrit 43.8 40.0 - 53.0 % Final    MCV 93 78 - 100 fl Final    MCH 30.7 26.5 - 33.0 pg Final    MCHC 33.1 31.5 - 36.5 g/dL Final    RDW 13.5 10.0 - 15.0 % Final    Platelet Count 187 150 - 450 10e9/L Final    Diff Method Automated Method  Final    % Neutrophils 46.4 % Final    % Lymphocytes 35.3 % Final    % Monocytes 11.6 % Final    % Eosinophils 6.0 % Final    % Basophils 0.6 % Final    % Immature Granulocytes 0.1 %  Final    Absolute Neutrophil 3.7 1.6 - 8.3 10e9/L Final    Absolute Lymphocytes 2.8 0.8 - 5.3 10e9/L Final    Absolute Monocytes 0.9 0.0 - 1.3 10e9/L Final    Absolute Eosinophils 0.5 0.0 - 0.7 10e9/L Final    Absolute Basophils 0.1 0.0 - 0.2 10e9/L Final    Abs Immature Granulocytes 0.0 0 - 0.4 10e9/L Final         4/30/2017  6:05 AM      Component Results     Component Value Ref Range & Units Status    Sodium 144 133 - 144 mmol/L Final    Potassium 3.6 3.4 - 5.3 mmol/L Final    Chloride 107 98 - 110 mmol/L Final    Carbon Dioxide 26 20 - 32 mmol/L Final    Anion Gap 11 3 - 14 mmol/L Final    Glucose 103 (H) 70 - 99 mg/dL Final    Urea Nitrogen 15 7 - 30 mg/dL Final    Creatinine 0.87 0.50 - 1.00 mg/dL Final    GFR Estimate >90  Non  GFR Calc   >60 mL/min/1.7m2 Final    GFR Estimate If Black >90   GFR Calc   >60 mL/min/1.7m2 Final    Calcium 9.0 8.5 - 10.1 mg/dL Final    Bilirubin Total 0.2 0.2 - 1.3 mg/dL Final    Albumin 4.0 3.4 - 5.0 g/dL Final    Protein Total 7.3 6.8 - 8.8 g/dL Final    Alkaline Phosphatase 88 65 - 260 U/L Final    ALT 27 0 - 50 U/L Final    AST 8 0 - 35 U/L Final         4/30/2017  6:03 AM      Component Results     Component Value Ref Range & Units Status    Lipase 148 73 - 393 U/L Final         4/30/2017  5:04 AM      [facility] Procedure Note      FAST (Focused Abdominal Sonography for Trauma) Ultrasound Examination:    PROCEDURE: PERFORMED BY: Dr. Denys Rosado  INDICATIONS/SYMPTOM:  Flank pain  PROBE: Cardiac phased array probe  BODY LOCATION: The ultrasound was performed in the abdominal and subxiphoid areas.  FINDINGS: No evidence of free fluid in hepatorenal (Morison s pouch), perisplenic, or and pelvic areas. No evidence of pericardial effusion.     INTERPRETATION: The FAST exam was normal. There was no free fluid present. There was no pericardial effusion.  IMAGE DOCUMENTATION: Images were archived to hard drive.            "  4/30/2017  6:46 AM      Narrative     XR WRIST RT G/E 3 VW 4/30/2017 6:15 AM    HISTORY: Pain.    COMPARISON: None.    FINDINGS: No fracture or malalignment. Osseous structures are within  normal limits.        Impression     IMPRESSION: No acute osseous abnormality.    CARLOS TILLMAN MD         4/30/2017  6:46 AM      Narrative     XR CERVICAL SPINE 2/3 VWS 4/30/2017 6:15 AM    HISTORY: Neck pain.    COMPARISON: None.    FINDINGS: Cervical alignment is anatomic. Vertebral body heights and  disc spaces are preserved.        Impression     IMPRESSION: Normal cervical spine.    CARLOS TILLMAN MD         4/30/2017  6:47 AM      Narrative     XR RIBS & CHEST RT 3VW 4/30/2017 6:15 AM    HISTORY: Lower rib pain after injury.    COMPARISON: None.    FINDINGS: No airspace consolidation, pleural effusion or pneumothorax.  Normal heart size. Additional views of the ribs show no acute osseous  abnormality.        Impression     IMPRESSION: No acute abnormality.    CARLOS TILLMAN MD                Thank you for choosing [facility]       Thank you for choosing [facility] for your care. Our goal is always to provide you with excellent care. Hearing back from our patients is one way we can continue to improve our services. Please take a few minutes to complete the written survey that you may receive in the mail after you visit with us. Thank you!        [email or web address] Information     [email or web address] lets you send messages to your doctor, view your test results, renew your prescriptions, schedule appointments and more. To sign up, go to [email or web address] . Click on \"Log in\" on the left side of the screen, which will take you to the Welcome page. Then click on \"Sign up Now\" on the right side of the page.     You will be asked to enter the access code listed below, as well as some personal information. Please follow the directions to create your username and password.     Your access code is: [email or web address]  Expires: 5/22/2017  3:07 PM     Your " access code will  in 90 days. If you need help or a new code, please call your [place] clinic or [phone].        Care EveryWhere ID     This is your Care EveryWhere ID. This could be used by other organizations to access your [place] medical records  [id number]        After Visit Summary       This is your record. Keep this with you and show to your community pharmacist(s) and doctor(s) at your next visit.

## 2017-06-05 ENCOUNTER — OFFICE VISIT (OUTPATIENT)
Dept: FAMILY MEDICINE | Facility: CLINIC | Age: 22
End: 2017-06-05
Payer: COMMERCIAL

## 2017-06-05 VITALS
BODY MASS INDEX: 27.11 KG/M2 | TEMPERATURE: 97.8 F | SYSTOLIC BLOOD PRESSURE: 108 MMHG | WEIGHT: 194.4 LBS | HEART RATE: 76 BPM | DIASTOLIC BLOOD PRESSURE: 42 MMHG

## 2017-06-05 DIAGNOSIS — G44.219 EPISODIC TENSION-TYPE HEADACHE, NOT INTRACTABLE: ICD-10-CM

## 2017-06-05 DIAGNOSIS — F90.2 ATTENTION DEFICIT HYPERACTIVITY DISORDER (ADHD), COMBINED TYPE: ICD-10-CM

## 2017-06-05 DIAGNOSIS — F32.0 MAJOR DEPRESSIVE DISORDER, SINGLE EPISODE, MILD (H): Primary | ICD-10-CM

## 2017-06-05 DIAGNOSIS — G44.209 TENSION HEADACHE: ICD-10-CM

## 2017-06-05 PROCEDURE — 99214 OFFICE O/P EST MOD 30 MIN: CPT | Performed by: PHYSICIAN ASSISTANT

## 2017-06-05 RX ORDER — DEXTROAMPHETAMINE SULFATE, DEXTROAMPHETAMINE SACCHARATE, AMPHETAMINE SULFATE AND AMPHETAMINE ASPARTATE 7.5; 7.5; 7.5; 7.5 MG/1; MG/1; MG/1; MG/1
30 CAPSULE, EXTENDED RELEASE ORAL DAILY
Qty: 30 CAPSULE | Refills: 0 | Status: SHIPPED | OUTPATIENT
Start: 2017-06-05 | End: 2017-06-05

## 2017-06-05 RX ORDER — DEXTROAMPHETAMINE SULFATE, DEXTROAMPHETAMINE SACCHARATE, AMPHETAMINE SULFATE AND AMPHETAMINE ASPARTATE 7.5; 7.5; 7.5; 7.5 MG/1; MG/1; MG/1; MG/1
30 CAPSULE, EXTENDED RELEASE ORAL DAILY
Qty: 30 CAPSULE | Refills: 0 | Status: SHIPPED | OUTPATIENT
Start: 2017-08-04 | End: 2018-04-05

## 2017-06-05 RX ORDER — KETOROLAC TROMETHAMINE 10 MG/1
10 TABLET, FILM COATED ORAL EVERY 6 HOURS PRN
Qty: 20 TABLET | Refills: 0 | Status: SHIPPED | OUTPATIENT
Start: 2017-06-05 | End: 2018-04-05

## 2017-06-05 RX ORDER — DEXTROAMPHETAMINE SULFATE, DEXTROAMPHETAMINE SACCHARATE, AMPHETAMINE SULFATE AND AMPHETAMINE ASPARTATE 7.5; 7.5; 7.5; 7.5 MG/1; MG/1; MG/1; MG/1
30 CAPSULE, EXTENDED RELEASE ORAL DAILY
Qty: 30 CAPSULE | Refills: 0 | Status: SHIPPED | OUTPATIENT
Start: 2017-07-05 | End: 2017-06-05

## 2017-06-05 ASSESSMENT — ENCOUNTER SYMPTOMS
COUGH: 0
CONSTIPATION: 0
DYSURIA: 0
BACK PAIN: 0
DEPRESSION: 0
FREQUENCY: 0
HALLUCINATIONS: 0
EYE REDNESS: 0
SEIZURES: 0
WHEEZING: 0
TINGLING: 0
DIAPHORESIS: 0
INSOMNIA: 0
WEAKNESS: 0
NERVOUS/ANXIOUS: 0
DOUBLE VISION: 0
LOSS OF CONSCIOUSNESS: 0
ABDOMINAL PAIN: 0
ORTHOPNEA: 0
FOCAL WEAKNESS: 0
SPUTUM PRODUCTION: 0
BLOOD IN STOOL: 0
NAUSEA: 0
SORE THROAT: 0
MYALGIAS: 0
PHOTOPHOBIA: 0
SENSORY CHANGE: 0
SHORTNESS OF BREATH: 0
DIARRHEA: 0
HEMOPTYSIS: 0
BLURRED VISION: 0
PALPITATIONS: 0
DIZZINESS: 0
HEADACHES: 1
VOMITING: 0
NECK PAIN: 0
WEIGHT LOSS: 0
FEVER: 0
HEARTBURN: 0
EYE PAIN: 0
EYE DISCHARGE: 0

## 2017-06-05 ASSESSMENT — ANXIETY QUESTIONNAIRES
5. BEING SO RESTLESS THAT IT IS HARD TO SIT STILL: NOT AT ALL
2. NOT BEING ABLE TO STOP OR CONTROL WORRYING: NOT AT ALL
GAD7 TOTAL SCORE: 4
1. FEELING NERVOUS, ANXIOUS, OR ON EDGE: SEVERAL DAYS
7. FEELING AFRAID AS IF SOMETHING AWFUL MIGHT HAPPEN: SEVERAL DAYS
6. BECOMING EASILY ANNOYED OR IRRITABLE: SEVERAL DAYS
3. WORRYING TOO MUCH ABOUT DIFFERENT THINGS: SEVERAL DAYS

## 2017-06-05 ASSESSMENT — LIFESTYLE VARIABLES: SUBSTANCE_ABUSE: 0

## 2017-06-05 ASSESSMENT — PATIENT HEALTH QUESTIONNAIRE - PHQ9: 5. POOR APPETITE OR OVEREATING: NOT AT ALL

## 2017-06-05 NOTE — LETTER
My Depression Action Plan  Name: Anthony Edwards   Date of Birth 1995  Date: 6/5/2017    My doctor: Orville Eubanks   My clinic: [address] [id number] [phone]          GREEN    ZONE   Good Control    What it looks like:     Things are going generally well. You have normal up s and down s. You may even feel depressed from time to time, but bad moods usually last less than a day.   What you need to do:  1. Continue to care for yourself (see self care plan)  2. Check your depression survival kit and update it as needed  3. Follow your physician s recommendations including any medication.  4. Do not stop taking medication unless you consult with your physician first.           YELLOW         ZONE Getting Worse    What it looks like:     Depression is starting to interfere with your life.     It may be hard to get out of bed; you may be starting to isolate yourself from others.    Symptoms of depression are starting to last most all day and this has happened for several days.     You may have suicidal thoughts but they are not constant.   What you need to do:     1. Call your care team, your response to treatment will improve if you keep your care team informed of your progress. Yellow periods are signs an adjustment may need to be made.     2. Continue your self-care, even if you have to fake it!    3. Talk to someone in your support network    4. Open up your depression survival kit           RED    ZONE Medical Alert - Get Help    What it looks like:     Depression is seriously interfering with your life.     You may experience these or other symptoms: You can t get out of bed most days, can t work or engage in other necessary activities, you have trouble taking care of basic hygiene, or basic responsibilities, thoughts of suicide or death that will not go away, self-injurious behavior.     What you need to do:  1. Call your care team  and request a same-day appointment. If they are not available (weekends or after hours) call your local crisis line, emergency room or 911.      Electronically signed by: Jeni Mcbride, June 5, 2017    Depression Self Care Plan / Survival Kit    Self-Care for Depression  Here s the deal. Your body and mind are really not as separate as most people think.  What you do and think affects how you feel and how you feel influences what you do and think. This means if you do things that people who feel good do, it will help you feel better.  Sometimes this is all it takes.  There is also a place for medication and therapy depending on how severe your depression is, so be sure to consult with your medical provider and/ or Behavioral Health Consultant if your symptoms are worsening or not improving.     In order to better manage my stress, I will:    Exercise  Get some form of exercise, every day. This will help reduce pain and release endorphins, the  feel good  chemicals in your brain. This is almost as good as taking antidepressants!  This is not the same as joining a gym and then never going! (they count on that by the way ) It can be as simple as just going for a walk or doing some gardening, anything that will get you moving.      Hygiene   Maintain good hygiene (Get out of bed in the morning, Make your bed, Brush your teeth, Take a shower, and Get dressed like you were going to work, even if you are unemployed).  If your clothes don't fit try to get ones that do.    Diet  I will strive to eat foods that are good for me, drink plenty of water, and avoid excessive sugar, caffeine, alcohol, and other mood-altering substances.  Some foods that are helpful in depression are: complex carbohydrates, B vitamins, flaxseed, fish or fish oil, fresh fruits and vegetables.    Psychotherapy  I agree to participate in Individual Therapy (if recommended).    Medication  If prescribed medications, I agree to take them.  Missing  doses can result in serious side effects.  I understand that drinking alcohol, or other illicit drug use, may cause potential side effects.  I will not stop my medication abruptly without first discussing it with my provider.    Staying Connected With Others  I will stay in touch with my friends, family members, and my primary care provider/team.    Use your imagination  Be creative.  We all have a creative side; it doesn t matter if it s oil painting, sand castles, or mud pies! This will also kick up the endorphins.    Witness Beauty  (AKA stop and smell the roses) Take a look outside, even in mid-winter. Notice colors, textures. Watch the squirrels and birds.     Service to others  Be of service to others.  There is always someone else in need.  By helping others we can  get out of ourselves  and remember the really important things.  This also provides opportunities for practicing all the other parts of the program.    Humor  Laugh and be silly!  Adjust your TV habits for less news and crime-drama and more comedy.    Control your stress  Try breathing deep, massage therapy, biofeedback, and meditation. Find time to relax each day.     My support system    Clinic Contact:  Phone number:    Contact 1:  Phone number:    Contact 2:  Phone number:    [employer]/:  Phone number:    Therapist:  Phone number:    Local crisis center:    Phone number:    Other community support:  Phone number:

## 2017-06-05 NOTE — MR AVS SNAPSHOT
After Visit Summary   6/5/2017    Anthony Edwards    MRN: [id number]           Patient Information     Date Of Birth          1995        Visit Information        Provider Department      6/5/2017 3:40 PM Orville Eubanks PA-C [facility]        Today's Diagnoses     Major depressive disorder, single episode, mild (H)    -  1    Attention deficit hyperactivity disorder (ADHD), combined type        Episodic tension-type headache, not intractable          Care Instructions    Schedule neurology appointment          Follow-ups after your visit        Additional Services     NEUROLOGY ADULT REFERRAL       Your provider has referred you to: [facility]: [facility] [phone]   [email or web address]    Reason for Referral: Consult    Please be aware that coverage of these services is subject to the terms and limitations of your health insurance plan.  Call member services at your health plan with any benefit or coverage questions.      Please bring the following with you to your appointment:    (1) Any X-Rays, CTs or MRIs which have been performed.  Contact the facility where they were done to arrange for  prior to your scheduled appointment.    (2) List of current medications  (3) This referral request   (4) Any documents/labs given to you for this referral                  Who to contact     If you have questions or need follow up information about today's clinic visit or your schedule please contact [facility] directly at [phone].  Normal or non-critical lab and imaging results will be communicated to you by MyChart, letter or phone within 4 business days after the clinic has received the results. If you do not hear from us within 7 days, please contact the clinic through MyChart or phone. If you have a critical or abnormal lab result, we will notify you by phone as soon as possible.  Submit refill requests  "through [employer] or call your pharmacy and they will forward the refill request to us. Please allow 3 business days for your refill to be completed.          Additional Information About Your Visit        [employer] Information     [employer] lets you send messages to your doctor, view your test results, renew your prescriptions, schedule appointments and more. To sign up, go to [email or web address] . Click on \"Log in\" on the left side of the screen, which will take you to the Welcome page. Then click on \"Sign up Now\" on the right side of the page.     You will be asked to enter the access code listed below, as well as some personal information. Please follow the directions to create your username and password.     Your access code is: [email or web address]  Expires: 9/3/2017  4:04 PM     Your access code will  in 90 days. If you need help or a new code, please call your [place] clinic or [phone].        Care EveryWhere ID     This is your Care EveryWhere ID. This could be used by other organizations to access your [place] medical records  [id number]        Your Vitals Were     Pulse Temperature BMI (Body Mass Index)             76 97.8  F (36.6  C) (Tympanic) 27.11 kg/m2          Blood Pressure from Last 3 Encounters:   17 108/42   17 104/47   17 110/66    Weight from Last 3 Encounters:   17 194 lb 6.4 oz (88.2 kg) (90 %)*   17 175 lb (79.4 kg) (76 %)*   17 165 lb (74.8 kg) (66 %)*     * Growth percentiles are based on CDC 2-20 Years data.              We Performed the Following     DEPRESSION ACTION PLAN (DAP)     NEUROLOGY ADULT REFERRAL          Today's Medication Changes          These changes are accurate as of: 17  4:04 PM.  If you have any questions, ask your nurse or doctor.               Start taking these medicines.        Dose/Directions    ADDERALL XR 30 MG per 24 hr capsule   Used for:  Attention deficit hyperactivity disorder (ADHD), combined type   Generic " drug:  amphetamine-dextroamphetamine   Started by:  Orville Eubanks PA-C        Dose:  30 mg   Start taking on:  8/4/2017   Take 1 capsule (30 mg) by mouth daily   Quantity:  30 capsule   Refills:  0         These medicines have changed or have updated prescriptions.        Dose/Directions    fluticasone 50 MCG/ACT spray   Commonly known as:  FLONASE   This may have changed:  Another medication with the same name was removed. Continue taking this medication, and follow the directions you see here.   Used for:  Allergic rhinitis due to pollen   Changed by:  Orville Eubanks PA-C        Dose:  2 spray   Spray 2 sprays in nostril daily   Quantity:  16 g   Refills:  11            Where to get your medicines      Some of these will need a paper prescription and others can be bought over the counter.  Ask your nurse if you have questions.     Bring a paper prescription for each of these medications     ADDERALL XR 30 MG per 24 hr capsule                Primary Care Provider Office Phone # Fax #    Orville Eubanks PA-C [phone]       [address] [address]        Thank you!     Thank you for choosing [facility]  for your care. Our goal is always to provide you with excellent care. Hearing back from our patients is one way we can continue to improve our services. Please take a few minutes to complete the written survey that you may receive in the mail after your visit with us. Thank you!             Your Updated Medication List - Protect others around you: Learn how to safely use, store and throw away your medicines at www.disposemymeds.org.          This list is accurate as of: 6/5/17  4:04 PM.  Always use your most recent med list.                   Brand Name Dispense Instructions for use    ADDERALL XR 30 MG per 24 hr capsule   Generic drug:  amphetamine-dextroamphetamine   Start taking on:  8/4/2017     30 capsule    Take 1 capsule (30 mg) by  mouth daily       albuterol 108 (90 BASE) MCG/ACT Inhaler    PROAIR HFA/PROVENTIL HFA/VENTOLIN HFA    1 Inhaler    Inhale 2 puffs into the lungs every 6 hours as needed for shortness of breath / dyspnea or wheezing       citalopram 20 MG tablet    celeXA    90 tablet    Take 1 tablet (20 mg) by mouth daily       cyclobenzaprine 10 MG tablet    FLEXERIL    60 tablet    Take 1 tablet (10 mg) by mouth 3 times daily as needed for muscle spasms       fluticasone 50 MCG/ACT spray    FLONASE    16 g    Spray 2 sprays in nostril daily       ibuprofen 200 MG tablet    ADVIL/MOTRIN     Take 400 mg by mouth       ketorolac 10 MG tablet    TORADOL    20 tablet    Take 1 tablet (10 mg) by mouth every 6 hours as needed       loratadine 10 MG tablet    CLARITIN    30 tablet    Take 1 tablet (10 mg) by mouth daily       montelukast 10 MG tablet    SINGULAIR    30 tablet    Take 1 tablet (10 mg) by mouth At Bedtime       olopatadine 0.1 % ophthalmic solution    PATANOL    1 Bottle    Place 1 drop into both eyes 2 times daily       oxyCODONE-acetaminophen 5-325 MG per tablet    PERCOCET    10 tablet    Take 1-2 tablets by mouth every 6 hours as needed for moderate to severe pain

## 2017-06-05 NOTE — NURSING NOTE
"Chief Complaint   Patient presents with     Behavioral Problem     Headache       Initial /42 (BP Location: Right arm, Patient Position: Chair, Cuff Size: Adult Regular)  Pulse 76  Temp 97.8  F (36.6  C) (Tympanic)  Wt 194 lb 6.4 oz (88.2 kg)  BMI 27.11 kg/m2 Estimated body mass index is 27.11 kg/(m^2) as calculated from the following:    Height as of 4/30/17: 5' 11\" (1.803 m).    Weight as of this encounter: 194 lb 6.4 oz (88.2 kg).  Medication Reconciliation: complete    Health Maintenance that is potentially due pending provider review:  PHQ9 and GAD7    Possibly completing today per provider review.    Jeni Mcbride sma      "

## 2017-06-05 NOTE — PROGRESS NOTES
HPI    SUBJECTIVE:                                                    Anthony Edwards is a 21 year old male who presents to clinic today for follow-up of his headaches which have been worsening. He states that he gets approximately 3-4 headaches per week. The severity of the headaches have increased and has caused vomiting.  His Adderall has been stable and no problems with that medication    Migraine Follow-Up    Headaches symptoms:  Worsened     Frequency: 3-4 times a week     Duration of headaches: all night or a few hours    Able to do normal daily activities/work with migraines: No - have to go in a dark room with eyes closed    Rescue/Relief medication:ibuprofen (Advil, Motrin) and Excedrin              Effectiveness: moderate relief    Preventative medication: None    Neurologic complications: No new stroke-like symptoms, loss of vision or speech, numbness or weakness    In the past 4 weeks, how often have you gone to Urgent Care or the emergency room because of your headaches?  0       Amount of exercise or physical activity: 4-5 days/week for an average of greater than 60 minutes    Problems taking medications regularly: No    Medication side effects: none    Diet: regular (no restrictions)    Medication Followup of Adderall     Taking Medication as prescribed: yes    Side Effects:  Loss of apetite    Medication Helping Symptoms:  yes           Problem list and histories reviewed & adjusted, as indicated.  Additional history: as documented    Patient Active Problem List   Diagnosis     Atopic rhinitis     Attention deficit hyperactivity disorder (ADHD), combined type     Major depressive disorder, single episode, mild (H)     Stimulant dependence (H)     History reviewed. No pertinent surgical history.    Social History   Substance Use Topics     Smoking status: Current Some Day Smoker     Smokeless tobacco: Not on file     Alcohol use No     History reviewed. No pertinent family history.      Current  Outpatient Prescriptions   Medication Sig Dispense Refill     [START ON 8/4/2017] ADDERALL XR 30 MG per 24 hr capsule Take 1 capsule (30 mg) by mouth daily 30 capsule 0     ketorolac (TORADOL) 10 MG tablet Take 1 tablet (10 mg) by mouth every 6 hours as needed 20 tablet 0     oxyCODONE-acetaminophen (PERCOCET) 5-325 MG per tablet Take 1-2 tablets by mouth every 6 hours as needed for moderate to severe pain 10 tablet 0     citalopram (CELEXA) 20 MG tablet Take 1 tablet (20 mg) by mouth daily 90 tablet 3     albuterol (PROAIR HFA, PROVENTIL HFA, VENTOLIN HFA) 108 (90 BASE) MCG/ACT inhaler Inhale 2 puffs into the lungs every 6 hours as needed for shortness of breath / dyspnea or wheezing 1 Inhaler 0     cyclobenzaprine (FLEXERIL) 10 MG tablet Take 1 tablet (10 mg) by mouth 3 times daily as needed for muscle spasms 60 tablet 1     ibuprofen (ADVIL,MOTRIN) 200 MG tablet Take 400 mg by mouth       fluticasone (FLONASE) 50 MCG/ACT nasal spray Spray 2 sprays in nostril daily 16 g 11     olopatadine (PATANOL) 0.1 % ophthalmic solution Place 1 drop into both eyes 2 times daily 1 Bottle 11     loratadine (CLARITIN) 10 MG tablet Take 1 tablet (10 mg) by mouth daily 30 tablet 11     montelukast (SINGULAIR) 10 MG tablet Take 1 tablet (10 mg) by mouth At Bedtime 30 tablet 11     No Known Allergies  Labs reviewed in EPIC    Reviewed and updated as needed this visit by clinical staff       Reviewed and updated as needed this visit by Provider             Review of Systems   Constitutional: Negative for diaphoresis, fever, malaise/fatigue and weight loss.   HENT: Negative for congestion, ear discharge, ear pain, hearing loss, nosebleeds and sore throat.    Eyes: Negative for blurred vision, double vision, photophobia, pain, discharge and redness.   Respiratory: Negative for cough, hemoptysis, sputum production, shortness of breath and wheezing.    Cardiovascular: Negative for chest pain, palpitations, orthopnea and leg swelling.    Gastrointestinal: Negative for abdominal pain, blood in stool, constipation, diarrhea, heartburn, melena, nausea and vomiting.   Genitourinary: Negative.  Negative for dysuria, frequency and urgency.   Musculoskeletal: Negative for back pain, joint pain, myalgias and neck pain.   Skin: Negative for itching and rash.   Neurological: Positive for headaches. Negative for dizziness, tingling, sensory change, focal weakness, seizures, loss of consciousness and weakness.   Endo/Heme/Allergies: Negative.    Psychiatric/Behavioral: Negative for depression, hallucinations, substance abuse and suicidal ideas. The patient is not nervous/anxious and does not have insomnia.          Physical Exam   Constitutional: He is oriented to person, place, and time and well-developed, well-nourished, and in no distress.   HENT:   Head: Normocephalic and atraumatic.   Right Ear: External ear normal.   Left Ear: External ear normal.   Nose: Nose normal.   Mouth/Throat: Oropharynx is clear and moist.   Eyes: Conjunctivae and EOM are normal. Pupils are equal, round, and reactive to light. Right eye exhibits no discharge. Left eye exhibits no discharge. No scleral icterus.   Neck: Normal range of motion. Neck supple. No thyromegaly present.   Cardiovascular: Normal rate, regular rhythm, normal heart sounds and intact distal pulses.  Exam reveals no gallop and no friction rub.    No murmur heard.  Pulmonary/Chest: Effort normal and breath sounds normal. No respiratory distress. He has no wheezes. He has no rales. He exhibits no tenderness.   Abdominal: Soft. Bowel sounds are normal. He exhibits no distension and no mass. There is no tenderness. There is no rebound and no guarding.   Musculoskeletal: Normal range of motion. He exhibits no edema or tenderness.   Lymphadenopathy:     He has no cervical adenopathy.   Neurological: He is alert and oriented to person, place, and time. He has normal reflexes. No cranial nerve deficit. He exhibits  normal muscle tone. Gait normal. Coordination normal.   Skin: Skin is warm and dry. No rash noted. No erythema.   Psychiatric: Mood, memory, affect and judgment normal.         (F32.0) Major depressive disorder, single episode, mild (H)  (primary encounter diagnosis)  Comment:   Plan:     (F90.2) Attention deficit hyperactivity disorder (ADHD), combined type  Comment:   Plan: ADDERALL XR 30 MG per 24 hr capsule,         DISCONTINUED: ADDERALL XR 30 MG per 24 hr         capsule, DISCONTINUED: ADDERALL XR 30 MG per 24        hr capsule            (G44.219) Episodic tension-type headache, not intractable  Comment:   Plan: NEUROLOGY ADULT REFERRAL            (G44.209) Tension headache  Comment:   Plan: ketorolac (TORADOL) 10 MG tablet            Temporarily, I have given him some Toradol for the headaches before he has his neurology appointment.  Refill of his Adderall was given and he will follow up after neurology

## 2017-06-06 ASSESSMENT — ANXIETY QUESTIONNAIRES: GAD7 TOTAL SCORE: 4

## 2017-06-06 ASSESSMENT — PATIENT HEALTH QUESTIONNAIRE - PHQ9: SUM OF ALL RESPONSES TO PHQ QUESTIONS 1-9: 3

## 2017-12-18 ENCOUNTER — OFFICE VISIT (OUTPATIENT)
Dept: FAMILY MEDICINE | Facility: CLINIC | Age: 22
End: 2017-12-18
Payer: COMMERCIAL

## 2017-12-18 VITALS
RESPIRATION RATE: 14 BRPM | WEIGHT: 213 LBS | SYSTOLIC BLOOD PRESSURE: 110 MMHG | DIASTOLIC BLOOD PRESSURE: 62 MMHG | BODY MASS INDEX: 29.71 KG/M2 | OXYGEN SATURATION: 96 % | HEART RATE: 90 BPM | TEMPERATURE: 99.3 F

## 2017-12-18 DIAGNOSIS — B34.9 VIRAL ILLNESS: ICD-10-CM

## 2017-12-18 DIAGNOSIS — R07.0 THROAT PAIN: Primary | ICD-10-CM

## 2017-12-18 LAB
DEPRECATED S PYO AG THROAT QL EIA: NORMAL
SPECIMEN SOURCE: NORMAL

## 2017-12-18 PROCEDURE — 87081 CULTURE SCREEN ONLY: CPT | Performed by: PHYSICIAN ASSISTANT

## 2017-12-18 PROCEDURE — 87880 STREP A ASSAY W/OPTIC: CPT | Performed by: PHYSICIAN ASSISTANT

## 2017-12-18 PROCEDURE — 99213 OFFICE O/P EST LOW 20 MIN: CPT | Performed by: PHYSICIAN ASSISTANT

## 2017-12-18 ASSESSMENT — ENCOUNTER SYMPTOMS
NAUSEA: 0
HEADACHES: 1
CONSTIPATION: 0
BLURRED VISION: 0
CHILLS: 1
EYE PAIN: 0
DEPRESSION: 0
FEVER: 1
WEAKNESS: 1
DIARRHEA: 0
BACK PAIN: 0
SORE THROAT: 1
PALPITATIONS: 0
DIZZINESS: 0
COUGH: 1
DYSURIA: 0
ABDOMINAL PAIN: 0
FREQUENCY: 0

## 2017-12-18 NOTE — MR AVS SNAPSHOT
"              After Visit Summary   2017    Anthony Edwards    MRN: [id number]           Patient Information     Date Of Birth          1995        Visit Information        Provider Department      2017 10:00 AM Orville Eubanks PA-C [facility]        Today's Diagnoses     Throat pain    -  1    Viral illness           Follow-ups after your visit        Follow-up notes from your care team     Return if symptoms worsen or fail to improve.      Who to contact     If you have questions or need follow up information about today's clinic visit or your schedule please contact [facility] directly at [phone].  Normal or non-critical lab and imaging results will be communicated to you by Micreoshart, letter or phone within 4 business days after the clinic has received the results. If you do not hear from us within 7 days, please contact the clinic through Micreoshart or phone. If you have a critical or abnormal lab result, we will notify you by phone as soon as possible.  Submit refill requests through [facility] or call your pharmacy and they will forward the refill request to us. Please allow 3 business days for your refill to be completed.          Additional Information About Your Visit        MyChart Information     [facility] lets you send messages to your doctor, view your test results, renew your prescriptions, schedule appointments and more. To sign up, go to [email or web address] . Click on \"Log in\" on the left side of the screen, which will take you to the Welcome page. Then click on \"Sign up Now\" on the right side of the page.     You will be asked to enter the access code listed below, as well as some personal information. Please follow the directions to create your username and password.     Your access code is: [email or web address]  Expires: 3/18/2018 10:49 AM     Your access code will  in 90 days. If you need help or a new code, please call your [facility] " or [phone].        Care EveryWhere ID     This is your Care EveryWhere ID. This could be used by other organizations to access your [facility] medical records  [id number]        Your Vitals Were     Pulse Temperature Respirations Pulse Oximetry BMI (Body Mass Index)       90 99.3  F (37.4  C) (Tympanic) 14 96% 29.71 kg/m2        Blood Pressure from Last 3 Encounters:   12/18/17 110/62   06/05/17 108/42   04/30/17 104/47    Weight from Last 3 Encounters:   12/18/17 213 lb (96.6 kg)   06/05/17 194 lb 6.4 oz (88.2 kg) (90 %)*   04/30/17 175 lb (79.4 kg) (76 %)*     * Growth percentiles are based on [facility] 2-20 Years data.              We Performed the Following     Beta strep group A culture     Rapid strep screen        Primary Care Provider Office Phone # Fax #    Orville Eubanks PA-C [phone] [address] [place], [place] [address]        Equal Access to Services     [facility]: Hadii iona orr hadguanakoo Soeileen, waaxda luqadaha, qaybta kaalmarichard perkins, mckay flores . So [facility] [phone].    ATENCIÓN: Si habla español, tiene a siegel disposición servicios gratuitos de asistencia lingüística. Llame al [phone].    We comply with applicable federal civil rights laws and [place] laws. We do not discriminate on the basis of race, color, national origin, age, disability, sex, sexual orientation, or gender identity.            Thank you!     Thank you for choosing [facility]  for your care. Our goal is always to provide you with excellent care. Hearing back from our patients is one way we can continue to improve our services. Please take a few minutes to complete the written survey that you may receive in the mail after your visit with us. Thank you!             Your Updated Medication List - Protect others around you: Learn how to safely use, store and throw away your medicines at www.disposemymeds.org.          This list is accurate as of: 12/18/17  10:49 AM.  Always use your most recent med list.                   Brand Name Dispense Instructions for use Diagnosis    ADDERALL XR 30 MG per 24 hr capsule   Generic drug:  amphetamine-dextroamphetamine     30 capsule    Take 1 capsule (30 mg) by mouth daily    Attention deficit hyperactivity disorder (ADHD), combined type       albuterol 108 (90 BASE) MCG/ACT Inhaler    PROAIR HFA/PROVENTIL HFA/VENTOLIN HFA    1 Inhaler    Inhale 2 puffs into the lungs every 6 hours as needed for shortness of breath / dyspnea or wheezing    Acute bronchitis with symptoms > 10 days       citalopram 20 MG tablet    celeXA    90 tablet    Take 1 tablet (20 mg) by mouth daily    Major depressive disorder, single episode, mild (H)       cyclobenzaprine 10 MG tablet    FLEXERIL    60 tablet    Take 1 tablet (10 mg) by mouth 3 times daily as needed for muscle spasms    Acute bilateral thoracic back pain       fluticasone 50 MCG/ACT spray    FLONASE    16 g    Spray 2 sprays in nostril daily    Allergic rhinitis due to pollen       ibuprofen 200 MG tablet    ADVIL/MOTRIN     Take 400 mg by mouth        ketorolac 10 MG tablet    TORADOL    20 tablet    Take 1 tablet (10 mg) by mouth every 6 hours as needed    Tension headache       loratadine 10 MG tablet    CLARITIN    30 tablet    Take 1 tablet (10 mg) by mouth daily    Allergic rhinitis due to pollen       montelukast 10 MG tablet    SINGULAIR    30 tablet    Take 1 tablet (10 mg) by mouth At Bedtime    Allergic rhinitis due to pollen       olopatadine 0.1 % ophthalmic solution    PATANOL    1 Bottle    Place 1 drop into both eyes 2 times daily    Allergic rhinitis due to pollen       oxyCODONE-acetaminophen 5-325 MG per tablet    PERCOCET    10 tablet    Take 1-2 tablets by mouth every 6 hours as needed for moderate to severe pain

## 2017-12-18 NOTE — LETTER
December 19, 2017      Anthony Edwards  [address]    [place] [address]        Dear Anthony,         This letter is to inform you that the results of your recent throat culture are negative.  If you have any questions please call or make an appointment.          Sincerely,        Orville Eubanks PA-C

## 2017-12-18 NOTE — LETTER
[facility]  [address] [place] [id number]  Phone: [phone]  Fax: [phone]    December 18, 2017        Anthony Edwards  [address] [address]          To whom it may concern:    RE: Anthony Edwards    Patient was seen and treated today at our clinic and missed work due to illness. He may return to work 12/19/17 if feeling better.    Please contact me for questions or concerns.      Sincerely,        Orville Eubanks PA-C

## 2017-12-18 NOTE — PROGRESS NOTES
HPI  SUBJECTIVE:   Anthony Edwards is a 22 year old male who presents to clinic today for 4 day history of sore throat, cough, nasal congestion and intermittent fever      RESPIRATORY SYMPTOMS      Duration: 4 days     Description  nasal congestion, rhinorrhea, sore throat, facial pain/pressure, cough, wheezing, fever, chills and fatigue/malaise    Severity: moderate    Accompanying signs and symptoms: None    History (predisposing factors):  none    Precipitating or alleviating factors: None    Therapies tried and outcome:  rest and fluids oral decongestant            Problem list and histories reviewed & adjusted, as indicated.  Additional history: as documented    Patient Active Problem List   Diagnosis     Atopic rhinitis     Attention deficit hyperactivity disorder (ADHD), combined type     Major depressive disorder, single episode, mild (H)     Stimulant dependence (H)     No past surgical history on file.    Social History   Substance Use Topics     Smoking status: Current Some Day Smoker     Smokeless tobacco: Not on file     Alcohol use No     No family history on file.      Current Outpatient Prescriptions   Medication Sig Dispense Refill     ADDERALL XR 30 MG per 24 hr capsule Take 1 capsule (30 mg) by mouth daily 30 capsule 0     ketorolac (TORADOL) 10 MG tablet Take 1 tablet (10 mg) by mouth every 6 hours as needed 20 tablet 0     oxyCODONE-acetaminophen (PERCOCET) 5-325 MG per tablet Take 1-2 tablets by mouth every 6 hours as needed for moderate to severe pain 10 tablet 0     citalopram (CELEXA) 20 MG tablet Take 1 tablet (20 mg) by mouth daily 90 tablet 3     albuterol (PROAIR HFA, PROVENTIL HFA, VENTOLIN HFA) 108 (90 BASE) MCG/ACT inhaler Inhale 2 puffs into the lungs every 6 hours as needed for shortness of breath / dyspnea or wheezing 1 Inhaler 0     cyclobenzaprine (FLEXERIL) 10 MG tablet Take 1 tablet (10 mg) by mouth 3 times daily as needed for muscle spasms 60 tablet 1     ibuprofen  (ADVIL,MOTRIN) 200 MG tablet Take 400 mg by mouth       fluticasone (FLONASE) 50 MCG/ACT nasal spray Spray 2 sprays in nostril daily 16 g 11     olopatadine (PATANOL) 0.1 % ophthalmic solution Place 1 drop into both eyes 2 times daily 1 Bottle 11     loratadine (CLARITIN) 10 MG tablet Take 1 tablet (10 mg) by mouth daily 30 tablet 11     montelukast (SINGULAIR) 10 MG tablet Take 1 tablet (10 mg) by mouth At Bedtime 30 tablet 11     No Known Allergies  Labs reviewed in EPIC      Reviewed and updated as needed this visit by clinical staffAllergies       Reviewed and updated as needed this visit by Provider         Review of Systems   Constitutional: Positive for chills, fever and malaise/fatigue.   HENT: Positive for sore throat. Negative for congestion, ear pain, hearing loss and nosebleeds.    Eyes: Negative for blurred vision and pain.   Respiratory: Positive for cough.    Cardiovascular: Negative for chest pain and palpitations.   Gastrointestinal: Negative for abdominal pain, constipation, diarrhea and nausea.   Genitourinary: Negative for dysuria and frequency.   Musculoskeletal: Negative for back pain and joint pain.   Skin: Negative for rash.   Neurological: Positive for weakness and headaches. Negative for dizziness.   Psychiatric/Behavioral: Negative for depression.         Physical Exam   Constitutional: He is oriented to person, place, and time and well-developed, well-nourished, and in no distress.   HENT:   Head: Normocephalic and atraumatic.   Right Ear: External ear normal.   Left Ear: External ear normal.   Nose: Nose normal.   Mouth/Throat: Oropharyngeal exudate, posterior oropharyngeal edema and posterior oropharyngeal erythema present.   Eyes: Conjunctivae and EOM are normal. Pupils are equal, round, and reactive to light. Right eye exhibits no discharge. Left eye exhibits no discharge. No scleral icterus.   Neck: Normal range of motion. Neck supple. No thyromegaly present.   Cardiovascular:  Normal rate, regular rhythm, normal heart sounds and intact distal pulses.  Exam reveals no gallop and no friction rub.    No murmur heard.  Pulmonary/Chest: Effort normal and breath sounds normal. No respiratory distress. He has no wheezes. He has no rales. He exhibits no tenderness.   Abdominal: Soft. Bowel sounds are normal. He exhibits no distension and no mass. There is no tenderness. There is no rebound and no guarding.   Musculoskeletal: Normal range of motion. He exhibits no edema or tenderness.   Lymphadenopathy:     He has no cervical adenopathy.   Neurological: He is alert and oriented to person, place, and time. He has normal reflexes. No cranial nerve deficit. He exhibits normal muscle tone. Gait normal. Coordination normal.   Skin: Skin is warm and dry. No rash noted. No erythema.   Psychiatric: Mood, memory, affect and judgment normal.       (R07.0) Throat pain  (primary encounter diagnosis)  Comment:   Plan: Rapid strep screen, Beta strep group A culture            (B34.9) Viral illness  Comment:   Plan:          strep screen was negative and we discussed symptomatic measures and he will follow-up on a as needed basis.

## 2017-12-19 LAB
BACTERIA SPEC CULT: NORMAL
SPECIMEN SOURCE: NORMAL

## 2017-12-31 ENCOUNTER — HEALTH MAINTENANCE LETTER (OUTPATIENT)
Age: 22
End: 2017-12-31

## 2018-04-05 ENCOUNTER — HOSPITAL ENCOUNTER (EMERGENCY)
Facility: CLINIC | Age: 23
Discharge: HOME | End: 2018-04-05
Attending: NURSE PRACTITIONER | Admitting: NURSE PRACTITIONER
Payer: COMMERCIAL

## 2018-04-05 VITALS
TEMPERATURE: 99.6 F | BODY MASS INDEX: 28.59 KG/M2 | OXYGEN SATURATION: 97 % | SYSTOLIC BLOOD PRESSURE: 121 MMHG | RESPIRATION RATE: 18 BRPM | HEART RATE: 88 BPM | WEIGHT: 205 LBS | DIASTOLIC BLOOD PRESSURE: 73 MMHG

## 2018-04-05 DIAGNOSIS — L02.92 FURUNCLE OF SKIN OR SUBCUTANEOUS TISSUE: ICD-10-CM

## 2018-04-05 PROCEDURE — 99213 OFFICE O/P EST LOW 20 MIN: CPT | Performed by: NURSE PRACTITIONER

## 2018-04-05 PROCEDURE — G0463 HOSPITAL OUTPT CLINIC VISIT: HCPCS

## 2018-04-05 RX ORDER — FLUTICASONE PROPIONATE 50 MCG
2 SPRAY, SUSPENSION (ML) NASAL
COMMUNITY
Start: 2015-04-20 | End: 2019-04-03

## 2018-04-05 RX ORDER — CEPHALEXIN 500 MG/1
500 CAPSULE ORAL 4 TIMES DAILY
Qty: 20 CAPSULE | Refills: 0 | Status: SHIPPED | OUTPATIENT
Start: 2018-04-05 | End: 2018-04-10

## 2018-04-05 RX ORDER — MONTELUKAST SODIUM 10 MG/1
10 TABLET ORAL
COMMUNITY
Start: 2015-04-20

## 2018-04-05 RX ORDER — OLOPATADINE HYDROCHLORIDE 1 MG/ML
1 SOLUTION/ DROPS OPHTHALMIC
COMMUNITY
Start: 2015-04-20 | End: 2019-04-25

## 2018-04-05 RX ORDER — LORATADINE 10 MG/1
10 TABLET ORAL
COMMUNITY
Start: 2014-05-12

## 2018-04-05 NOTE — LETTER
[facility] EMERGENCY DEPARTMENT  [address] [facility] [address]  [phone]          April 9, 2018    RE:  Anthony Edwards                                                                                                                                                       [address] [address]            To whom it may concern:    Anthony Edwards was under my professional care for a procedure on 4/5/2018.  Please excuse him from work 4/5/2018 - 4/9/2018. He may return 4/10/2018.     Sincerely,         TATIANA Travis CNP

## 2018-04-05 NOTE — LETTER
[facility] EMERGENCY DEPARTMENT  [address] [facility] [address]  [phone]          April 5, 2018    RE:  Anthony Edwards                                                                                                                                                       [address] [address]            To whom it may concern:    Anthony Edwards was under my professional care in urgent care today. Patient is unable to work 4/5/2018 - 4/7/2018.    Sincerely,         TATIANA Travis CNP

## 2018-04-05 NOTE — ED NOTES
Patient here for abcess on the leg/groin area, symptoms started 2 days ago.  Patient presents ambulatory to the urgent care.

## 2018-04-05 NOTE — DISCHARGE INSTRUCTIONS
Warm shower twice daily to the area.  Warm packs 2-3x/daily.  Return for fever, increased redness, swelling, drainage or pain.

## 2018-04-05 NOTE — ED PROVIDER NOTES
History     Chief Complaint   Patient presents with     Cyst     HPI  Anthony Edwards is a 22 year old male who since urgent care for evaluation of right groin abscess.  Symptoms started 2 days ago.  Increased pain and swelling today.  Denies fever or chills.  Denies any history of MRSA or staph infections.    Problem List:    Patient Active Problem List    Diagnosis Date Noted     Attention deficit hyperactivity disorder (ADHD), combined type 12/19/2016     Priority: Medium     Major depressive disorder, single episode, mild (H) 12/19/2016     Priority: Medium     Stimulant dependence (H) 12/19/2016     Priority: Medium     Atopic rhinitis 05/20/2013     Priority: Medium        Past Medical History:    History reviewed. No pertinent past medical history.    Past Surgical History:    History reviewed. No pertinent surgical history.    Family History:    No family history on file.    Social History:  Marital Status:  Single [1]  Social History   Substance Use Topics     Smoking status: Current Some Day Smoker     Smokeless tobacco: Never Used     Alcohol use No        Medications:      fluticasone (FLONASE) 50 MCG/ACT spray   loratadine (CLARITIN) 10 MG tablet   montelukast (SINGULAIR) 10 MG tablet   olopatadine (PATANOL) 0.1 % ophthalmic solution   cephALEXin (KEFLEX) 500 MG capsule   citalopram (CELEXA) 20 MG tablet   albuterol (PROAIR HFA, PROVENTIL HFA, VENTOLIN HFA) 108 (90 BASE) MCG/ACT inhaler   cyclobenzaprine (FLEXERIL) 10 MG tablet   ibuprofen (ADVIL,MOTRIN) 200 MG tablet   [DISCONTINUED] montelukast (SINGULAIR) 10 MG tablet         Review of Systems  As mentioned above in the history present illness. All other systems were reviewed and are negative.    Physical Exam   BP: 121/73  Pulse: 88  Temp: 99.6  F (37.6  C)  Resp: 18  Weight: 93 kg (205 lb)  SpO2: 97 %      Physical Exam    GENERAL APPEARANCE: healthy, alert and no distress  RESP: lungs clear to auscultation - no rales, rhonchi or wheezes  CV:  regular rates and rhythm, normal S1 S2, no murmur noted  SKIN: right groin abscess (4cm). Surrounded by 3cm erythema.    ED Course     ED Course     Incision + drainage  Date/Time: 4/5/2018 4:00 PM  Performed by: NATI LIGHT  Authorized by: NATI LIGHT   Consent: Verbal consent obtained.  Risks and benefits: risks, benefits and alternatives were discussed  Consent given by: patient  Type: abscess  Location: right groin.  Anesthesia: local infiltration    Anesthesia:  Local Anesthetic: lidocaine 1% without epinephrine  Scalpel size: 11  Incision type: single straight  Incision depth: dermal  Complexity: simple  Drainage: purulent  Drainage amount: moderate  Wound treatment: wound left open  Packing material: none  Patient tolerance: Patient tolerated the procedure well with no immediate complications                   No results found for this or any previous visit (from the past 24 hour(s)).    Medications - No data to display    Assessments & Plan (with Medical Decision Making)       Warm shower twice daily to the area.  Warm packs 2-3x/daily.  Return for fever, increased redness, swelling, drainage or pain.  I have reviewed the nursing notes.    I have reviewed the findings, diagnosis, plan and need for follow up with the patient.      New Prescriptions    CEPHALEXIN (KEFLEX) 500 MG CAPSULE    Take 1 capsule (500 mg) by mouth 4 times daily for 5 days       Final diagnoses:   Furuncle of skin or subcutaneous tissue       4/5/2018   [facility] EMERGENCY DEPARTMENT     Nati Light APRN CNP  04/05/18 1020

## 2018-04-05 NOTE — ED AVS SNAPSHOT
[facility]    [address] [address]    Phone:  [phone]    Fax:  [phone]                                       Anthony Edwards   MRN: [id number]    Department:  [facility]   Date of Visit:  4/5/2018           After Visit Summary Signature Page     I have received my discharge instructions, and my questions have been answered. I have discussed any challenges I see with this plan with the nurse or doctor.    ..........................................................................................................................................  Patient/Patient Representative Signature      ..........................................................................................................................................  Patient Representative Print Name and Relationship to Patient    ..................................................               ................................................  Date                                            Time    ..........................................................................................................................................  Reviewed by Signature/Title    ...................................................              ..............................................  Date                                                            Time

## 2018-04-05 NOTE — ED AVS SNAPSHOT
[facility]    [address] [address]    Phone:  [phone]    Fax:  [phone]                                       Anthony Edwards   MRN: [id number]    Department:  [facility]   Date of Visit:  4/5/2018           Patient Information     Date Of Birth          1995        Your diagnoses for this visit were:     Furuncle of skin or subcutaneous tissue        You were seen by Salome Light APRN CNP.      Follow-up Information     Follow up with Orville Eubanks PA-C.    Specialty:  Physician Assistant    Why:  As needed    Contact information:    [address] [place] [address]  [phone]          Discharge Instructions       Warm shower twice daily to the area.  Warm packs 2-3x/daily.  Return for fever, increased redness, swelling, drainage or pain.    24 Hour Appointment Hotline       To make an appointment at any [facility] clinic, call [phone] ([phone]). If you don't have a family doctor or clinic, we will help you find one. [facility] clinics are conveniently located to serve the needs of you and your family.             Review of your medicines      START taking        Dose / Directions Last dose taken    cephALEXin 500 MG capsule   Commonly known as:  KEFLEX   Dose:  500 mg   Quantity:  20 capsule        Take 1 capsule (500 mg) by mouth 4 times daily for 5 days   Refills:  0          Our records show that you are taking the medicines listed below. If these are incorrect, please call your family doctor or clinic.        Dose / Directions Last dose taken    albuterol 108 (90 BASE) MCG/ACT Inhaler   Commonly known as:  PROAIR HFA/PROVENTIL HFA/VENTOLIN HFA   Dose:  2 puff   Quantity:  1 Inhaler        Inhale 2 puffs into the lungs every 6 hours as needed for shortness of breath / dyspnea or wheezing   Refills:  0        citalopram 20 MG tablet   Commonly known as:  celeXA   Dose:  20 mg   Quantity:  90  tablet        Take 1 tablet (20 mg) by mouth daily   Refills:  3        cyclobenzaprine 10 MG tablet   Commonly known as:  FLEXERIL   Dose:  10 mg   Quantity:  60 tablet        Take 1 tablet (10 mg) by mouth 3 times daily as needed for muscle spasms   Refills:  1        fluticasone 50 MCG/ACT spray   Commonly known as:  FLONASE   Dose:  2 spray        Spray 2 sprays in nostril   Refills:  0        ibuprofen 200 MG tablet   Commonly known as:  ADVIL/MOTRIN   Dose:  400 mg        Take 400 mg by mouth   Refills:  0        loratadine 10 MG tablet   Commonly known as:  CLARITIN   Dose:  10 mg        Take 10 mg by mouth   Refills:  0        montelukast 10 MG tablet   Commonly known as:  SINGULAIR   Dose:  10 mg        Take 10 mg by mouth   Refills:  0        olopatadine 0.1 % ophthalmic solution   Commonly known as:  PATANOL   Dose:  1 drop        1 drop   Refills:  0                Prescriptions were sent or printed at these locations (1 Prescription)                   [place] [address]    Telephone:  [phone]   Fax:  [phone]   Hours:                  E-Prescribed (1 of 1)         cephALEXin (KEFLEX) 500 MG capsule                Procedures and tests performed during your visit     Incision and drainage      Orders Needing Specimen Collection     None      Pending Results     No orders found from 4/3/2018 to 4/6/2018.            Pending Culture Results     No orders found from 4/3/2018 to 4/6/2018.            Pending Results Instructions     If you had any lab results that were not finalized at the time of your Discharge, you can call the ED Lab Result RN at [phone]. You will be contacted by this team for any positive Lab results or changes in treatment. The nurses are available 7 days a week from 10A to 6:30P.  You can leave a message 24 hours per day and they will return your call.        Test Results From Your Hospital Stay      "          Thank you for choosing [facility]       Thank you for choosing [facility] for your care. Our goal is always to provide you with excellent care. Hearing back from our patients is one way we can continue to improve our services. Please take a few minutes to complete the written survey that you may receive in the mail after you visit with us. Thank you!        [employer] Information     [email or web address] lets you send messages to your doctor, view your test results, renew your prescriptions, schedule appointments and more. To sign up, go to [email or web address] . Click on \"Log in\" on the left side of the screen, which will take you to the Welcome page. Then click on \"Sign up Now\" on the right side of the page.     You will be asked to enter the access code listed below, as well as some personal information. Please follow the directions to create your username and password.     Your access code is: [email or web address]  Expires: 2018  4:07 PM     Your access code will  in 90 days. If you need help or a new code, please call your [facility] clinic or [phone].        Care EveryWhere ID     This is your Care EveryWhere ID. This could be used by other organizations to access your [facility] medical records  [id number]        Equal Access to Services     SUSAN ALCANTARA : Elana owenso Soeileen, waaxda luqadaha, qaybta kaalmada adeegyada, mckay paniagua. So [facility] [phone].    ATENCIÓN: Si habla español, tiene a siegel disposición servicios gratuitos de asistencia lingüística. Llame al [phone].    We comply with applicable federal civil rights laws and [place] laws. We do not discriminate on the basis of race, color, national origin, age, disability, sex, sexual orientation, or gender identity.            After Visit Summary       This is your record. Keep this with you and show to your community pharmacist(s) and doctor(s) at your next visit.                  "

## 2018-07-31 ENCOUNTER — RADIANT APPOINTMENT (OUTPATIENT)
Dept: GENERAL RADIOLOGY | Facility: CLINIC | Age: 23
End: 2018-07-31
Attending: NURSE PRACTITIONER
Payer: COMMERCIAL

## 2018-07-31 ENCOUNTER — OFFICE VISIT (OUTPATIENT)
Dept: URGENT CARE | Facility: URGENT CARE | Age: 23
End: 2018-07-31
Payer: COMMERCIAL

## 2018-07-31 VITALS
RESPIRATION RATE: 20 BRPM | BODY MASS INDEX: 30.4 KG/M2 | WEIGHT: 218 LBS | HEART RATE: 80 BPM | SYSTOLIC BLOOD PRESSURE: 110 MMHG | DIASTOLIC BLOOD PRESSURE: 63 MMHG | TEMPERATURE: 97.7 F

## 2018-07-31 DIAGNOSIS — S99.922A FOOT INJURY, LEFT, INITIAL ENCOUNTER: ICD-10-CM

## 2018-07-31 DIAGNOSIS — S96.912A STRAIN OF LEFT ANKLE, INITIAL ENCOUNTER: Primary | ICD-10-CM

## 2018-07-31 PROCEDURE — 99214 OFFICE O/P EST MOD 30 MIN: CPT | Performed by: NURSE PRACTITIONER

## 2018-07-31 PROCEDURE — 73630 X-RAY EXAM OF FOOT: CPT | Mod: LT

## 2018-07-31 PROCEDURE — 73610 X-RAY EXAM OF ANKLE: CPT | Mod: LT

## 2018-07-31 ASSESSMENT — PAIN SCALES - GENERAL: PAINLEVEL: SEVERE PAIN (7)

## 2018-07-31 NOTE — NURSING NOTE
"Chief Complaint   Patient presents with     Musculoskeletal Problem     Left ankle.  Just happened.  Was jumping off Diving board and foot caught.  7/10.  Earlier there was numbness in toes.        Initial /63 (BP Location: Right arm, Cuff Size: Adult Large)  Pulse 80  Temp 97.7  F (36.5  C) (Tympanic)  Resp 20  Wt 218 lb (98.9 kg)  BMI 30.4 kg/m2 Estimated body mass index is 30.4 kg/(m^2) as calculated from the following:    Height as of 4/30/17: 5' 11\" (1.803 m).    Weight as of this encounter: 218 lb (98.9 kg).      Health Maintenance that is potentially due pending provider review:  NONE    n/a    Is there anyone who you would like to be able to receive your results? Not Applicable  If yes have patient fill out MIRNA  Grace Yadav M.A.          "

## 2018-07-31 NOTE — MR AVS SNAPSHOT
After Visit Summary   7/31/2018    Anthony Edwards    MRN: [id number]           Patient Information     Date Of Birth          1995        Visit Information        Provider Department      7/31/2018 6:25 PM Emili Farr APRN [facility]        Today's Diagnoses     Foot injury, left, initial encounter    -  1    Strain of left ankle, initial encounter          Care Instructions    Follow-up in 2 weeks     Rest the affected painful area as much as possible.  Apply ice for 15-20 minutes intermittently as needed and especially after any offending activity.    Daily stretching.  As pain recedes, begin normal activities slowly as tolerated.      Avoid rapid or heavy exertion for now. Activity as tolerated     Gentle stretching two to three times daily just to keep from stiffening up.      Alternate ice and heat, 20 minutes each for 2-3 cycles.  Gel packs work best for cold. Uncooked rice is best for heat.  Fill an old, clean sock and tie or sew up.  Microwave for 30-45 seconds. Careful not to burn.    Gradually ease back into activity as it gets better.         *SPRAIN:ANKLE [w/ x-ray]    A sprain is an injury to the ligaments or capsule that holds a joint together. There are no broken bones. Most sprains take from four to six weeks to heal. If the ligament is completely torn (severe sprain), it can take several months to recover.  Mild to Moderate sprains may be treated with an elastic wrap or an in-shoe splint to provide support and prevent re-injury. A mild sprain may not require any additional support. A severe sprain may require surgery to repair, but this is rare.  HOME CARE:  1. Stay off the injured leg as much as possible until you can walk on it without pain. If you have a lot of pain with walking, crutches or a walker may be prescribed. (These can be rented or purchased at many pharmacies and surgical or orthopedic supply stores). Follow your  doctor's advice regarding when to begin bearing weight on that leg.  2. Keep your leg elevated to reduce pain and swelling. When sleeping, place a pillow under the injured leg. When sitting, support the injured leg so it is level with your waist. This is very important during the first 48 hours.  3. Apply an ice pack (ice cubes in a plastic bag, wrapped in a towel) over the injured area for 20 minutes every 1-2 hours the first day. You can place the ice pack directly over the splint/cast. If you were given a boot, open it to apply the ice pack. Continue with ice packs 3-4 times a day for the next two days, then as needed for the relief of pain and swelling.  4. You may use acetaminophen (Tylenol) 650-1000 mg every 6 hours or ibuprofen (Motrin, Advil) 600 mg every 6-8 hours with food to control pain, if you are able to take these medicines. [NOTE: If you have chronic liver or kidney disease or ever had a stomach ulcer or GI bleeding, talk with your doctor before using these medicines.]  5. You may return to sports after healing, when you can run without pain.  6. A sprained ankle is at risk for re-injury during the first six weeks. During that time, protect your ankle with an in-shoe splint that prevents tilting of your ankle from side to side. This is very important if you do active work or play sports during that time.  FOLLOW UP with your doctor, or as advised, if you are not starting to improve within the next five days.     GET PROMPT MEDICAL ATTENTION if any of the following occur:    The plaster cast or splint gets wet or soft    The fiberglass cast or splint gets wet and does not dry for 24 hours    Pain or swelling increases, or redness appears    Toes become cold, blue, numb or tingly    8392-3117 [facility], [address] [place] [address], [place] [address]. All rights reserved. This information is not intended as a substitute for professional medical care. Always follow your healthcare professional's  instructions.            Follow-ups after your visit        Who to contact     If you have questions or need follow up information about today's clinic visit or your schedule please contact [facility] directly at [phone].  Normal or non-critical lab and imaging results will be communicated to you by MyChart, letter or phone within 4 business days after the clinic has received the results. If you do not hear from us within 7 days, please contact the clinic through MyChart or phone. If you have a critical or abnormal lab result, we will notify you by phone as soon as possible.  Submit refill requests through [employer] or call your pharmacy and they will forward the refill request to us. Please allow 3 business days for your refill to be completed.          Additional Information About Your Visit        Care EveryWhere ID     This is your Care EveryWhere ID. This could be used by other organizations to access your [facility] medical records  [id number]        Your Vitals Were     Pulse Temperature Respirations BMI (Body Mass Index)          80 97.7  F (36.5  C) (Tympanic) 20 30.4 kg/m2         Blood Pressure from Last 3 Encounters:   07/31/18 110/63   04/05/18 121/73   12/18/17 110/62    Weight from Last 3 Encounters:   07/31/18 218 lb (98.9 kg)   04/05/18 205 lb (93 kg)   12/18/17 213 lb (96.6 kg)                 Today's Medication Changes          These changes are accurate as of 7/31/18  7:05 PM.  If you have any questions, ask your nurse or doctor.               Start taking these medicines.        Dose/Directions    order for DME   Used for:  Strain of left ankle, initial encounter   Started by:  Emili Farr APRN CNP        Cam boot and crutches   Quantity:  1 Units   Refills:  0            Where to get your medicines      Some of these will need a paper prescription and others can be bought over the counter.  Ask your nurse if you have questions.     Bring a paper  prescription for each of these medications     order for DME                Primary Care Provider Office Phone # Fax #    Orville Eubanks PA-C [phone] [address] [place] [address]        Equal Access to Services     SUSAN ALCANTARA : Hadii iona orr romano Soomaali, waaxda luqadaha, qaybta kaalmada adeegyada, mckay torresn paul miller sandra paniagua. So [facility] [phone].    ATENCIÓN: Si habla español, tiene a siegel disposición servicios gratuitos de asistencia lingüística. Llame al [phone].    We comply with applicable federal civil rights laws and [place] laws. We do not discriminate on the basis of race, color, national origin, age, disability, sex, sexual orientation, or gender identity.            Thank you!     Thank you for choosing [facility]  for your care. Our goal is always to provide you with excellent care. Hearing back from our patients is one way we can continue to improve our services. Please take a few minutes to complete the written survey that you may receive in the mail after your visit with us. Thank you!             Your Updated Medication List - Protect others around you: Learn how to safely use, store and throw away your medicines at www.disposemymeds.org.          This list is accurate as of 7/31/18  7:05 PM.  Always use your most recent med list.                   Brand Name Dispense Instructions for use Diagnosis    albuterol 108 (90 Base) MCG/ACT Inhaler    PROAIR HFA/PROVENTIL HFA/VENTOLIN HFA    1 Inhaler    Inhale 2 puffs into the lungs every 6 hours as needed for shortness of breath / dyspnea or wheezing    Acute bronchitis with symptoms > 10 days       citalopram 20 MG tablet    celeXA    90 tablet    Take 1 tablet (20 mg) by mouth daily    Major depressive disorder, single episode, mild (H)       cyclobenzaprine 10 MG tablet    FLEXERIL    60 tablet    Take 1 tablet (10 mg) by mouth 3 times daily as needed for muscle  spasms    Acute bilateral thoracic back pain       fluticasone 50 MCG/ACT spray    FLONASE     Spray 2 sprays in nostril        ibuprofen 200 MG tablet    ADVIL/MOTRIN     Take 400 mg by mouth        loratadine 10 MG tablet    CLARITIN     Take 10 mg by mouth        montelukast 10 MG tablet    SINGULAIR     Take 10 mg by mouth        olopatadine 0.1 % ophthalmic solution    PATANOL     1 drop        order for DME     1 Units    Cam boot and crutches    Strain of left ankle, initial encounter

## 2018-07-31 NOTE — PROGRESS NOTES
SUBJECTIVE:  Anthony Edwards is a 22 year old male who presents today for left ankle pain.   Injury occurred 1 day ago.    The mechanism of injury includes: landed wrong on diving board   Severe Pain (7)  Quality:   What makes it worse: walking  What makes it better:nothing  Associated Signs/ Symptoms:   Treatment: ibuprofen  History of recurrent ankle injuries: yes  Symptoms are Unchanged  Denies any numbness/tingling.    History reviewed. No pertinent past medical history.  History   Smoking Status     Current Some Day Smoker   Smokeless Tobacco     Never Used     ROS:  CONSTITUTIONAL:NEGATIVE for fever, chills, change in weight  INTEGUMENTARY/SKIN: NEGATIVE for worrisome rashes, moles or lesions  EYES: NEGATIVE for vision changes or irritation  ENT/MOUTH: NEGATIVE for ear, mouth and throat problems  RESP:NEGATIVE for significant cough or SOB  CV: NEGATIVE for chest pain, palpitations or peripheral edema  GI: NEGATIVE for nausea, abdominal pain, heartburn, or change in bowel habits  MUSCULOSKELETAL: See above   NEURO: NEGATIVE for weakness, dizziness or paresthesias      OBJECTIVE:  Blood pressure 110/63, pulse 80, temperature 97.7  F (36.5  C), temperature source Tympanic, resp. rate 20, weight 218 lb (98.9 kg).  Patient is alert and no in apparent distress.  Ankle Exam (left):  Inspection:swelling around the medial malleolus  Palpation:tender over lateral malleolus  tender over medial malleolus  Good doralis pedis and posterior tibial pulses  Neurovascularly Intact Distally.   Special Tests: Pain with flexion  Pain with extension  EXAM:  Constitutional: healthy, alert and no distress   Cardiovascular: negative, PMI normal. No lifts, heaves, or thrills. RRR. No murmurs, clicks gallops or rub  Respiratory: negative, Percussion normal. Good diaphragmatic excursion. Lungs clear  Psychiatric: mentation appears normal and affect normal/bright  NEURO: Gait normal. Reflexes normal and symmetric. Sensation grossly  WNL.    X-Ray:     XR FOOT LT G/E 3 VW, XR ANKLE LT G/E 3 VW 7/31/2018 6:52 PM      HISTORY: ; Foot injury, left, initial encounter         IMPRESSION: Negative exam.     LUIS ANTONIO BROWN MD    XR FOOT LT G/E 3 VW, XR ANKLE LT G/E 3 VW 7/31/2018 6:52 PM      HISTORY: ; Foot injury, left, initial encounter         IMPRESSION: Negative exam.     ASSESSMENT:    ICD-10-CM    1. Strain of left ankle, initial encounter S96.912A order for DME   2. Foot injury, left, initial encounter S99.922A XR Ankle Left G/E 3 Views     XR Foot Left G/E 3 Views     CANCELED: XR Ankle Left G/E 3 Views     CANCELED: XR Foot Left G/E 3 Views         PLAN:  Cam boot and crutches provided today  Patient Instructions   Follow-up in 2 weeks     Rest the affected painful area as much as possible.  Apply ice for 15-20 minutes intermittently as needed and especially after any offending activity.    Daily stretching.  As pain recedes, begin normal activities slowly as tolerated.      Avoid rapid or heavy exertion for now. Activity as tolerated     Gentle stretching two to three times daily just to keep from stiffening up.      Alternate ice and heat, 20 minutes each for 2-3 cycles.  Gel packs work best for cold. Uncooked rice is best for heat.  Fill an old, clean sock and tie or sew up.  Microwave for 30-45 seconds. Careful not to burn.    Gradually ease back into activity as it gets better.         *SPRAIN:ANKLE [w/ x-ray]    A sprain is an injury to the ligaments or capsule that holds a joint together. There are no broken bones. Most sprains take from four to six weeks to heal. If the ligament is completely torn (severe sprain), it can take several months to recover.  Mild to Moderate sprains may be treated with an elastic wrap or an in-shoe splint to provide support and prevent re-injury. A mild sprain may not require any additional support. A severe sprain may require surgery to repair, but this is rare.  HOME CARE:  1. Stay off the injured leg  as much as possible until you can walk on it without pain. If you have a lot of pain with walking, crutches or a walker may be prescribed. (These can be rented or purchased at many pharmacies and surgical or orthopedic supply stores). Follow your doctor's advice regarding when to begin bearing weight on that leg.  2. Keep your leg elevated to reduce pain and swelling. When sleeping, place a pillow under the injured leg. When sitting, support the injured leg so it is level with your waist. This is very important during the first 48 hours.  3. Apply an ice pack (ice cubes in a plastic bag, wrapped in a towel) over the injured area for 20 minutes every 1-2 hours the first day. You can place the ice pack directly over the splint/cast. If you were given a boot, open it to apply the ice pack. Continue with ice packs 3-4 times a day for the next two days, then as needed for the relief of pain and swelling.  4. You may use acetaminophen (Tylenol) 650-1000 mg every 6 hours or ibuprofen (Motrin, Advil) 600 mg every 6-8 hours with food to control pain, if you are able to take these medicines. [NOTE: If you have chronic liver or kidney disease or ever had a stomach ulcer or GI bleeding, talk with your doctor before using these medicines.]  5. You may return to sports after healing, when you can run without pain.  6. A sprained ankle is at risk for re-injury during the first six weeks. During that time, protect your ankle with an in-shoe splint that prevents tilting of your ankle from side to side. This is very important if you do active work or play sports during that time.  FOLLOW UP with your doctor, or as advised, if you are not starting to improve within the next five days.     GET PROMPT MEDICAL ATTENTION if any of the following occur:    The plaster cast or splint gets wet or soft    The fiberglass cast or splint gets wet and does not dry for 24 hours    Pain or swelling increases, or redness appears    Toes become cold,  blue, numb or tingly    6084-4318 [employer] [address], [place] [address]. All rights reserved. This information is not intended as a substitute for professional medical care. Always follow your healthcare professional's instructions.          TATIANA Ball CNP

## 2018-08-01 NOTE — PATIENT INSTRUCTIONS
Follow-up in 2 weeks     Rest the affected painful area as much as possible.  Apply ice for 15-20 minutes intermittently as needed and especially after any offending activity.    Daily stretching.  As pain recedes, begin normal activities slowly as tolerated.      Avoid rapid or heavy exertion for now. Activity as tolerated     Gentle stretching two to three times daily just to keep from stiffening up.      Alternate ice and heat, 20 minutes each for 2-3 cycles.  Gel packs work best for cold. Uncooked rice is best for heat.  Fill an old, clean sock and tie or sew up.  Microwave for 30-45 seconds. Careful not to burn.    Gradually ease back into activity as it gets better.         *SPRAIN:ANKLE [w/ x-ray]    A sprain is an injury to the ligaments or capsule that holds a joint together. There are no broken bones. Most sprains take from four to six weeks to heal. If the ligament is completely torn (severe sprain), it can take several months to recover.  Mild to Moderate sprains may be treated with an elastic wrap or an in-shoe splint to provide support and prevent re-injury. A mild sprain may not require any additional support. A severe sprain may require surgery to repair, but this is rare.  HOME CARE:  1. Stay off the injured leg as much as possible until you can walk on it without pain. If you have a lot of pain with walking, crutches or a walker may be prescribed. (These can be rented or purchased at many pharmacies and surgical or orthopedic supply stores). Follow your doctor's advice regarding when to begin bearing weight on that leg.  2. Keep your leg elevated to reduce pain and swelling. When sleeping, place a pillow under the injured leg. When sitting, support the injured leg so it is level with your waist. This is very important during the first 48 hours.  3. Apply an ice pack (ice cubes in a plastic bag, wrapped in a towel) over the injured area for 20 minutes every 1-2 hours the first day. You can place the  ice pack directly over the splint/cast. If you were given a boot, open it to apply the ice pack. Continue with ice packs 3-4 times a day for the next two days, then as needed for the relief of pain and swelling.  4. You may use acetaminophen (Tylenol) 650-1000 mg every 6 hours or ibuprofen (Motrin, Advil) 600 mg every 6-8 hours with food to control pain, if you are able to take these medicines. [NOTE: If you have chronic liver or kidney disease or ever had a stomach ulcer or GI bleeding, talk with your doctor before using these medicines.]  5. You may return to sports after healing, when you can run without pain.  6. A sprained ankle is at risk for re-injury during the first six weeks. During that time, protect your ankle with an in-shoe splint that prevents tilting of your ankle from side to side. This is very important if you do active work or play sports during that time.  FOLLOW UP with your doctor, or as advised, if you are not starting to improve within the next five days.     GET PROMPT MEDICAL ATTENTION if any of the following occur:    The plaster cast or splint gets wet or soft    The fiberglass cast or splint gets wet and does not dry for 24 hours    Pain or swelling increases, or redness appears    Toes become cold, blue, numb or tingly    [id number] [facility], [address] [place] [address], [place] [address]. All rights reserved. This information is not intended as a substitute for professional medical care. Always follow your healthcare professional's instructions.

## 2018-08-13 ENCOUNTER — OFFICE VISIT (OUTPATIENT)
Dept: FAMILY MEDICINE | Facility: CLINIC | Age: 23
End: 2018-08-13
Payer: COMMERCIAL

## 2018-08-13 VITALS
DIASTOLIC BLOOD PRESSURE: 58 MMHG | HEIGHT: 71 IN | SYSTOLIC BLOOD PRESSURE: 166 MMHG | BODY MASS INDEX: 30.8 KG/M2 | TEMPERATURE: 98.2 F | RESPIRATION RATE: 16 BRPM | HEART RATE: 74 BPM | WEIGHT: 220 LBS

## 2018-08-13 DIAGNOSIS — S93.401D SPRAIN OF RIGHT ANKLE, UNSPECIFIED LIGAMENT, SUBSEQUENT ENCOUNTER: Primary | ICD-10-CM

## 2018-08-13 DIAGNOSIS — F32.0 MAJOR DEPRESSIVE DISORDER, SINGLE EPISODE, MILD (H): ICD-10-CM

## 2018-08-13 PROCEDURE — 99213 OFFICE O/P EST LOW 20 MIN: CPT | Performed by: NURSE PRACTITIONER

## 2018-08-13 ASSESSMENT — ANXIETY QUESTIONNAIRES
2. NOT BEING ABLE TO STOP OR CONTROL WORRYING: NOT AT ALL
GAD7 TOTAL SCORE: 5
5. BEING SO RESTLESS THAT IT IS HARD TO SIT STILL: SEVERAL DAYS
6. BECOMING EASILY ANNOYED OR IRRITABLE: SEVERAL DAYS
7. FEELING AFRAID AS IF SOMETHING AWFUL MIGHT HAPPEN: NOT AT ALL
1. FEELING NERVOUS, ANXIOUS, OR ON EDGE: SEVERAL DAYS
3. WORRYING TOO MUCH ABOUT DIFFERENT THINGS: SEVERAL DAYS

## 2018-08-13 ASSESSMENT — PATIENT HEALTH QUESTIONNAIRE - PHQ9: 5. POOR APPETITE OR OVEREATING: SEVERAL DAYS

## 2018-08-13 NOTE — MR AVS SNAPSHOT
"              After Visit Summary   8/13/2018    Anthony Edwards    MRN: [id number]           Patient Information     Date Of Birth          1995        Visit Information        Provider Department      8/13/2018 3:00 PM Shereen Najera NP [facility]        Today's Diagnoses     Sprain of right ankle, unspecified ligament, subsequent encounter    -  1    Major depressive disorder, single episode, mild (H)           Follow-ups after your visit        Follow-up notes from your care team     Return if symptoms worsen or fail to improve.      Who to contact     If you have questions or need follow up information about today's clinic visit or your schedule please contact [facility] directly at [phone].  Normal or non-critical lab and imaging results will be communicated to you by MyChart, letter or phone within 4 business days after the clinic has received the results. If you do not hear from us within 7 days, please contact the clinic through MyChart or phone. If you have a critical or abnormal lab result, we will notify you by phone as soon as possible.  Submit refill requests through [employer] or call your pharmacy and they will forward the refill request to us. Please allow 3 business days for your refill to be completed.          Additional Information About Your Visit        Care EveryWhere ID     This is your Care EveryWhere ID. This could be used by other organizations to access your Mobile medical records  [id number]        Your Vitals Were     Pulse Temperature Respirations Height BMI (Body Mass Index)       74 98.2  F (36.8  C) (Tympanic) 16 5' 11.25\" (1.81 m) 30.47 kg/m2        Blood Pressure from Last 3 Encounters:   08/13/18 166/58   07/31/18 110/63   04/05/18 121/73    Weight from Last 3 Encounters:   08/13/18 220 lb (99.8 kg)   07/31/18 218 lb (98.9 kg)   04/05/18 205 lb (93 kg)              Today, you had the following     No orders found for " display         Today's Medication Changes          These changes are accurate as of 8/13/18  5:27 PM.  If you have any questions, ask your nurse or doctor.               Start taking these medicines.        Dose/Directions    order for DME   Used for:  Sprain of right ankle, unspecified ligament, subsequent encounter   Started by:  Shereen Najera NP        Equipment being ordered: Tri [place] ankle Brace   Quantity:  1 Device   Refills:  0            Where to get your medicines      Some of these will need a paper prescription and others can be bought over the counter.  Ask your nurse if you have questions.     Bring a paper prescription for each of these medications     order for DME                Primary Care Provider Office Phone # Fax #    Orville Eubanks PA-C [phone] [address] [place] [id number]        Equal Access to Services     SUSAN ALCANTARA : Elana owenso Soeileen, waaxda luqadaha, qaybta kaalmada adeegyada, mckay flores . So [facility] [phone].    ATENCIÓN: Si habla español, tiene a siegel disposición servicios gratuitos de asistencia lingüística. Llame al [phone].    We comply with applicable federal civil rights laws and [place] laws. We do not discriminate on the basis of race, color, national origin, age, disability, sex, sexual orientation, or gender identity.            Thank you!     Thank you for choosing [facility]  for your care. Our goal is always to provide you with excellent care. Hearing back from our patients is one way we can continue to improve our services. Please take a few minutes to complete the written survey that you may receive in the mail after your visit with us. Thank you!             Your Updated Medication List - Protect others around you: Learn how to safely use, store and throw away your medicines at www.disposemymeds.org.          This list is accurate as of 8/13/18  5:27 PM.  Always  use your most recent med list.                   Brand Name Dispense Instructions for use Diagnosis    albuterol 108 (90 Base) MCG/ACT inhaler    PROAIR HFA/PROVENTIL HFA/VENTOLIN HFA    1 Inhaler    Inhale 2 puffs into the lungs every 6 hours as needed for shortness of breath / dyspnea or wheezing    Acute bronchitis with symptoms > 10 days       citalopram 20 MG tablet    celeXA    90 tablet    Take 1 tablet (20 mg) by mouth daily    Major depressive disorder, single episode, mild (H)       cyclobenzaprine 10 MG tablet    FLEXERIL    60 tablet    Take 1 tablet (10 mg) by mouth 3 times daily as needed for muscle spasms    Acute bilateral thoracic back pain       fluticasone 50 MCG/ACT spray    FLONASE     Spray 2 sprays in nostril        ibuprofen 200 MG tablet    ADVIL/MOTRIN     Take 400 mg by mouth        loratadine 10 MG tablet    CLARITIN     Take 10 mg by mouth        montelukast 10 MG tablet    SINGULAIR     Take 10 mg by mouth        olopatadine 0.1 % ophthalmic solution    PATANOL     1 drop        order for DME     1 Units    Cam boot and crutches    Strain of left ankle, initial encounter       order for DME     1 Device    Equipment being ordered: [place] ankle Brace    Sprain of right ankle, unspecified ligament, subsequent encounter

## 2018-08-13 NOTE — PROGRESS NOTES
SUBJECTIVE:   Anthony Edwards is a 22 year old male who presents to clinic today for the following health issues:    Follow up ankle Pain    Onset: x 2 weeks    Description:   Location: left ankle  Character: Soreness after putting pressure on it.    Intensity: mild    Progression of Symptoms: intermittent    Accompanying Signs & Symptoms:  Other symptoms: radiation of pain sometimes into the left calf.    History:   Previous similar pain: no       Precipitating factors:   Trauma or overuse: YES- twisted ankle on diving board    Alleviating factors:  Improved by: ice, Ibuprofen and boot    Therapies Tried and outcome: ice, ibuprofen, and boot- seems to be helping.    Patient states that his ankle is doing much better.  He continues to wear the boot although he does not feel that he needs to.    Patient has fill out a PHQ 9.  He states that he has been off medications for some time and has been doing fine with no suicidal  idealization or depression.    Problem list and histories reviewed & adjusted, as indicated.  Additional history: as documented    Patient Active Problem List   Diagnosis     Atopic rhinitis     Attention deficit hyperactivity disorder (ADHD), combined type     Major depressive disorder, single episode, mild (H)     Stimulant dependence (H)     History reviewed. No pertinent surgical history.    Social History   Substance Use Topics     Smoking status: Current Some Day Smoker     Smokeless tobacco: Never Used     Alcohol use No     Family History   Problem Relation Age of Onset     Heart Failure Paternal Grandfather          Current Outpatient Prescriptions   Medication Sig Dispense Refill     cyclobenzaprine (FLEXERIL) 10 MG tablet Take 1 tablet (10 mg) by mouth 3 times daily as needed for muscle spasms 60 tablet 1     fluticasone (FLONASE) 50 MCG/ACT spray Spray 2 sprays in nostril       ibuprofen (ADVIL,MOTRIN) 200 MG tablet Take 400 mg by mouth       loratadine (CLARITIN) 10 MG tablet  "Take 10 mg by mouth       montelukast (SINGULAIR) 10 MG tablet Take 10 mg by mouth       olopatadine (PATANOL) 0.1 % ophthalmic solution 1 drop       order for DME Equipment being ordered: [employer] [place] ankle Brace 1 Device 0     order for DME Cam boot and crutches 1 Units 0     albuterol (PROAIR HFA, PROVENTIL HFA, VENTOLIN HFA) 108 (90 BASE) MCG/ACT inhaler Inhale 2 puffs into the lungs every 6 hours as needed for shortness of breath / dyspnea or wheezing (Patient not taking: Reported on 8/13/2018) 1 Inhaler 0     citalopram (CELEXA) 20 MG tablet Take 1 tablet (20 mg) by mouth daily (Patient not taking: Reported on 7/31/2018) 90 tablet 3     No Known Allergies    Reviewed and updated as needed this visit by clinical staff  Tobacco  Allergies  Meds  Problems  Med Hx  Surg Hx  Fam Hx  Soc Hx        Reviewed and updated as needed this visit by Provider  Allergies  Meds  Problems         ROS:  CONSTITUTIONAL: NEGATIVE for fever, chills, change in weight  RESP: NEGATIVE for significant cough or SOB  CV: NEGATIVE for chest pain, palpitations or peripheral edema  MUSCULOSKELETAL: Resolving left ankle sprain    OBJECTIVE:     /58  Pulse 74  Temp 98.2  F (36.8  C) (Tympanic)  Resp 16  Ht 5' 11.25\" (1.81 m)  Wt 220 lb (99.8 kg)  BMI 30.47 kg/m2  Body mass index is 30.47 kg/(m^2).  GENERAL: healthy, alert and no distress  RESP: lungs clear to auscultation - no rales, rhonchi or wheezes  CV: regular rate and rhythm, normal S1 S2, no S3 or S4, no murmur, click or rub, no peripheral edema and peripheral pulses strong  MS: mild swelling in the lateral malleolus, no tenderness with palpation and ROM is normal with no pain.    Diagnostic Test Results:  none     ASSESSMENT/PLAN:     1. Sprain of right ankle, unspecified ligament, subsequent encounter  Patient's ankle seems to be healing fine.  I do not feel that the big brace is beneficial for him at this time he needs to be able to get some range of motion in the " ankle.  I have recommended using a lace up ankle brace with a tennis shoe to help support but allow some movement in the ankle.  Continue ice and ibuprofen as needed.  Follow-up in clinic as needed.  - order for DME; Equipment being ordered: [employer] [place] ankle Brace  Dispense: 1 Device; Refill: 0    2. Major depressive disorder, single episode, mild (H)  Patient feels that he is able to control his depression at this time.  PHQ 9 shows some mild anxiety and depression.  Recommend follow-up if any flare in symptoms.    Shereen Najera NP  [facility]

## 2018-08-14 ASSESSMENT — PATIENT HEALTH QUESTIONNAIRE - PHQ9: SUM OF ALL RESPONSES TO PHQ QUESTIONS 1-9: 4

## 2018-08-14 ASSESSMENT — ANXIETY QUESTIONNAIRES: GAD7 TOTAL SCORE: 5

## 2019-03-04 ENCOUNTER — OFFICE VISIT (OUTPATIENT)
Dept: FAMILY MEDICINE | Facility: CLINIC | Age: 24
End: 2019-03-04
Payer: COMMERCIAL

## 2019-03-04 VITALS
DIASTOLIC BLOOD PRESSURE: 56 MMHG | BODY MASS INDEX: 31.36 KG/M2 | OXYGEN SATURATION: 97 % | HEART RATE: 75 BPM | RESPIRATION RATE: 20 BRPM | TEMPERATURE: 98.9 F | HEIGHT: 71 IN | WEIGHT: 224 LBS | SYSTOLIC BLOOD PRESSURE: 116 MMHG

## 2019-03-04 DIAGNOSIS — M54.41 ACUTE RIGHT-SIDED LOW BACK PAIN WITH RIGHT-SIDED SCIATICA: Primary | ICD-10-CM

## 2019-03-04 PROCEDURE — 99214 OFFICE O/P EST MOD 30 MIN: CPT | Performed by: PHYSICIAN ASSISTANT

## 2019-03-04 RX ORDER — HYDROCODONE BITARTRATE AND ACETAMINOPHEN 5; 325 MG/1; MG/1
1 TABLET ORAL EVERY 6 HOURS PRN
Qty: 10 TABLET | Refills: 0 | Status: SHIPPED | OUTPATIENT
Start: 2019-03-04 | End: 2019-08-15

## 2019-03-04 RX ORDER — CYCLOBENZAPRINE HCL 10 MG
10 TABLET ORAL 3 TIMES DAILY PRN
Qty: 30 TABLET | Refills: 0 | Status: SHIPPED | OUTPATIENT
Start: 2019-03-04

## 2019-03-04 RX ORDER — NAPROXEN 500 MG/1
500 TABLET ORAL 2 TIMES DAILY WITH MEALS
Qty: 60 TABLET | Refills: 1 | Status: SHIPPED | OUTPATIENT
Start: 2019-03-04

## 2019-03-04 ASSESSMENT — ENCOUNTER SYMPTOMS
PALPITATIONS: 0
DIARRHEA: 0
HEADACHES: 0
SHORTNESS OF BREATH: 0
BLURRED VISION: 0
SORE THROAT: 0
ABDOMINAL PAIN: 0
NAUSEA: 0
EYE DISCHARGE: 0
VOMITING: 0
EYE REDNESS: 0
WHEEZING: 0
MYALGIAS: 0
CHILLS: 0
BACK PAIN: 1
FEVER: 0
COUGH: 0

## 2019-03-04 ASSESSMENT — MIFFLIN-ST. JEOR: SCORE: 2039.22

## 2019-03-04 NOTE — PROGRESS NOTES
SUBJECTIVE:   Anthony Edwards is a 23 year old male who presents to clinic today for the following health issues:      Musculoskeletal problem/pain      Duration: 4-5 days but yesterday got worse     Description  Location: lower right side of back by hip area     Intensity:  moderate    Accompanying signs and symptoms: radiation of pain to down right leg, leg goes numb when there is shooting pain.     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 Pain started at home.  Aggravating factors include: certain movements and laying down     Therapies tried and outcome: rest/inactivity, heat, ice and Ibuprofen    Was at home when pain worsened yesterday. Rated a 5/10 constant and up to 9/10 with twisting, bending, lifting. Took ibuprofen 400mg twice yesterday. He denies bowel/bladder incontinence. No numbness/tingling/weakness.     Problem list and histories reviewed & adjusted, as indicated.  Additional history: as documented    Patient Active Problem List   Diagnosis     Atopic rhinitis     Attention deficit hyperactivity disorder (ADHD), combined type     Major depressive disorder, single episode, mild (H)     Stimulant dependence (H)     History reviewed. No pertinent surgical history.    Social History     Tobacco Use     Smoking status: Current Some Day Smoker     Smokeless tobacco: Never Used   Substance Use Topics     Alcohol use: No     Alcohol/week: 0.0 oz     Family History   Problem Relation Age of Onset     Heart Failure Paternal Grandfather          Current Outpatient Medications   Medication Sig Dispense Refill     cyclobenzaprine (FLEXERIL) 10 MG tablet Take 1 tablet (10 mg) by mouth 3 times daily as needed for muscle spasms 30 tablet 0     fluticasone (FLONASE) 50 MCG/ACT spray Spray 2 sprays in nostril       HYDROcodone-acetaminophen (NORCO) 5-325 MG  tablet Take 1 tablet by mouth every 6 hours as needed for severe pain 10 tablet 0     ibuprofen (ADVIL,MOTRIN) 200 MG tablet Take 400 mg by mouth       loratadine (CLARITIN) 10 MG tablet Take 10 mg by mouth       montelukast (SINGULAIR) 10 MG tablet Take 10 mg by mouth       naproxen (NAPROSYN) 500 MG tablet Take 1 tablet (500 mg) by mouth 2 times daily (with meals) 60 tablet 1     olopatadine (PATANOL) 0.1 % ophthalmic solution 1 drop       albuterol (PROAIR HFA, PROVENTIL HFA, VENTOLIN HFA) 108 (90 BASE) MCG/ACT inhaler Inhale 2 puffs into the lungs every 6 hours as needed for shortness of breath / dyspnea or wheezing (Patient not taking: Reported on 8/13/2018) 1 Inhaler 0     citalopram (CELEXA) 20 MG tablet Take 1 tablet (20 mg) by mouth daily (Patient not taking: Reported on 7/31/2018) 90 tablet 3     cyclobenzaprine (FLEXERIL) 10 MG tablet Take 1 tablet (10 mg) by mouth 3 times daily as needed for muscle spasms (Patient not taking: Reported on 3/4/2019) 60 tablet 1     order for DME Equipment being ordered: [employer] [place] ankle Brace (Patient not taking: Reported on 3/4/2019) 1 Device 0     order for DME Cam boot and crutches (Patient not taking: Reported on 3/4/2019) 1 Units 0     No Known Allergies  Labs reviewed in EPIC    Reviewed and updated as needed this visit by clinical staff  Tobacco  Allergies  Meds  Problems  Med Hx  Surg Hx  Fam Hx  Soc Hx        Reviewed and updated as needed this visit by Provider  Tobacco  Allergies  Meds  Problems  Med Hx  Surg Hx  Fam Hx         ROS:  Review of Systems   Constitutional: Negative for chills, fever and malaise/fatigue.   HENT: Negative for congestion, ear pain and sore throat.    Eyes: Negative for blurred vision, discharge and redness.   Respiratory: Negative for cough, shortness of breath and wheezing.    Cardiovascular: Negative for chest pain and palpitations.   Gastrointestinal: Negative for abdominal pain, diarrhea, nausea and vomiting.  "  Musculoskeletal: Positive for back pain. Negative for joint pain and myalgias.   Skin: Negative for rash.   Neurological: Negative for headaches.         OBJECTIVE:     /56   Pulse 75   Temp 98.9  F (37.2  C) (Tympanic)   Resp 20   Ht 1.797 m (5' 10.75\")   Wt 101.6 kg (224 lb)   SpO2 97%   BMI 31.46 kg/m    Body mass index is 31.46 kg/m .    Physical Exam   Constitutional: He appears well-developed and well-nourished. No distress.   Musculoskeletal:   No obvious deformity, erythema, edema, or ecchymosis of the thoracic or lumbar spine. Tenderness with palpation along the right lumbar spine and surrounding musculature. Positive right straight leg raise. Non-tender internal and external rotation of the hips. 2+ DTR s, lower extremity CMS intact.     Skin: Skin is warm, dry and intact.   Psychiatric: He has a normal mood and affect. His speech is normal and behavior is normal.       Diagnostic Test Results:  none     ASSESSMENT/PLAN:     1. Acute right-sided low back pain with right-sided sciatica  Can take naproxen 500mg two times daily with food x 5-10 days for pain and inflammation. Also prescribed flexeril 10mg three times daily as needed and Norco 5/325mg as needed at bedtime #10 with no refills. Discussed how to take these medications and what to expect. Can try alternating ice/heat in 20 minute intervals, avoid aggravating factors, but encouraged gentle ROM and stretching.  Would recommend physical therapy or follow up with primary care provider if symptoms worsen or do not improve.      - naproxen (NAPROSYN) 500 MG tablet; Take 1 tablet (500 mg) by mouth 2 times daily (with meals)  Dispense: 60 tablet; Refill: 1  - cyclobenzaprine (FLEXERIL) 10 MG tablet; Take 1 tablet (10 mg) by mouth 3 times daily as needed for muscle spasms  Dispense: 30 tablet; Refill: 0  - HYDROcodone-acetaminophen (NORCO) 5-325 MG tablet; Take 1 tablet by mouth every 6 hours as needed for severe pain  Dispense: 10 tablet; " Refill: 0       Una Barclay PA-C  [facility]

## 2019-03-04 NOTE — NURSING NOTE
"Chief Complaint   Patient presents with     Back Pain       Initial /56   Pulse 75   Temp 98.9  F (37.2  C) (Tympanic)   Resp 20   Ht 1.797 m (5' 10.75\")   Wt 101.6 kg (224 lb)   SpO2 97%   BMI 31.46 kg/m   Estimated body mass index is 31.46 kg/m  as calculated from the following:    Height as of this encounter: 1.797 m (5' 10.75\").    Weight as of this encounter: 101.6 kg (224 lb).    Patient presents to the clinic using No DME    Health Maintenance that is potentially due pending provider review:  Physical     Notified patient of due health maintenance.    Is there anyone who you would like to be able to receive your results? No  If yes have patient fill out MIRNA      "

## 2019-04-03 ENCOUNTER — TELEPHONE (OUTPATIENT)
Dept: FAMILY MEDICINE | Facility: CLINIC | Age: 24
End: 2019-04-03

## 2019-04-03 ENCOUNTER — HOSPITAL ENCOUNTER (EMERGENCY)
Facility: CLINIC | Age: 24
Discharge: HOME | End: 2019-04-03
Attending: STUDENT IN AN ORGANIZED HEALTH CARE EDUCATION/TRAINING PROGRAM | Admitting: STUDENT IN AN ORGANIZED HEALTH CARE EDUCATION/TRAINING PROGRAM
Payer: COMMERCIAL

## 2019-04-03 VITALS
RESPIRATION RATE: 23 BRPM | TEMPERATURE: 97.6 F | OXYGEN SATURATION: 99 % | DIASTOLIC BLOOD PRESSURE: 52 MMHG | HEART RATE: 71 BPM | BODY MASS INDEX: 32.21 KG/M2 | HEIGHT: 70 IN | WEIGHT: 225 LBS | SYSTOLIC BLOOD PRESSURE: 106 MMHG

## 2019-04-03 DIAGNOSIS — R42 LIGHTHEADEDNESS: ICD-10-CM

## 2019-04-03 LAB
ALBUMIN SERPL-MCNC: 4.1 G/DL (ref 3.4–5)
ALP SERPL-CCNC: 97 U/L (ref 40–150)
ALT SERPL W P-5'-P-CCNC: 44 U/L (ref 0–70)
ANION GAP SERPL CALCULATED.3IONS-SCNC: 8 MMOL/L (ref 3–14)
AST SERPL W P-5'-P-CCNC: 19 U/L (ref 0–45)
BASOPHILS # BLD AUTO: 0.1 10E9/L (ref 0–0.2)
BASOPHILS NFR BLD AUTO: 0.9 %
BILIRUB SERPL-MCNC: 0.3 MG/DL (ref 0.2–1.3)
BUN SERPL-MCNC: 12 MG/DL (ref 7–30)
CALCIUM SERPL-MCNC: 8.9 MG/DL (ref 8.5–10.1)
CHLORIDE SERPL-SCNC: 108 MMOL/L (ref 94–109)
CO2 SERPL-SCNC: 25 MMOL/L (ref 20–32)
CREAT SERPL-MCNC: 0.8 MG/DL (ref 0.66–1.25)
DIFFERENTIAL METHOD BLD: NORMAL
EOSINOPHIL NFR BLD AUTO: 3.1 %
ERYTHROCYTE [DISTWIDTH] IN BLOOD BY AUTOMATED COUNT: 12.9 % (ref 10–15)
GFR SERPL CREATININE-BSD FRML MDRD: >90 ML/MIN/{1.73_M2}
GLUCOSE SERPL-MCNC: 83 MG/DL (ref 70–99)
HCT VFR BLD AUTO: 45.8 % (ref 40–53)
HGB BLD-MCNC: 14.9 G/DL (ref 13.3–17.7)
IMM GRANULOCYTES # BLD: 0 10E9/L (ref 0–0.4)
IMM GRANULOCYTES NFR BLD: 0.4 %
LYMPHOCYTES # BLD AUTO: 2.1 10E9/L (ref 0.8–5.3)
LYMPHOCYTES NFR BLD AUTO: 30.4 %
MCH RBC QN AUTO: 29.9 PG (ref 26.5–33)
MCHC RBC AUTO-ENTMCNC: 32.5 G/DL (ref 31.5–36.5)
MCV RBC AUTO: 92 FL (ref 78–100)
MONOCYTES # BLD AUTO: 0.6 10E9/L (ref 0–1.3)
MONOCYTES NFR BLD AUTO: 8.6 %
NEUTROPHILS # BLD AUTO: 3.8 10E9/L (ref 1.6–8.3)
NEUTROPHILS NFR BLD AUTO: 56.6 %
NRBC # BLD AUTO: 0 10*3/UL
NRBC BLD AUTO-RTO: 0 /100
PLATELET # BLD AUTO: 205 10E9/L (ref 150–450)
POTASSIUM SERPL-SCNC: 3.8 MMOL/L (ref 3.4–5.3)
PROT SERPL-MCNC: 7.5 G/DL (ref 6.8–8.8)
RBC # BLD AUTO: 4.98 10E12/L (ref 4.4–5.9)
SODIUM SERPL-SCNC: 141 MMOL/L (ref 133–144)
TROPONIN I SERPL-MCNC: <0.015 UG/L (ref 0–0.04)
WBC # BLD AUTO: 6.7 10E9/L (ref 4–11)

## 2019-04-03 PROCEDURE — 96360 HYDRATION IV INFUSION INIT: CPT | Performed by: STUDENT IN AN ORGANIZED HEALTH CARE EDUCATION/TRAINING PROGRAM

## 2019-04-03 PROCEDURE — 84484 ASSAY OF TROPONIN QUANT: CPT | Performed by: STUDENT IN AN ORGANIZED HEALTH CARE EDUCATION/TRAINING PROGRAM

## 2019-04-03 PROCEDURE — 93010 ELECTROCARDIOGRAM REPORT: CPT | Mod: Z6 | Performed by: STUDENT IN AN ORGANIZED HEALTH CARE EDUCATION/TRAINING PROGRAM

## 2019-04-03 PROCEDURE — 99284 EMERGENCY DEPT VISIT MOD MDM: CPT | Mod: 25 | Performed by: STUDENT IN AN ORGANIZED HEALTH CARE EDUCATION/TRAINING PROGRAM

## 2019-04-03 PROCEDURE — 93005 ELECTROCARDIOGRAM TRACING: CPT | Performed by: STUDENT IN AN ORGANIZED HEALTH CARE EDUCATION/TRAINING PROGRAM

## 2019-04-03 PROCEDURE — 80053 COMPREHEN METABOLIC PANEL: CPT | Performed by: STUDENT IN AN ORGANIZED HEALTH CARE EDUCATION/TRAINING PROGRAM

## 2019-04-03 PROCEDURE — 25000128 H RX IP 250 OP 636: Performed by: STUDENT IN AN ORGANIZED HEALTH CARE EDUCATION/TRAINING PROGRAM

## 2019-04-03 PROCEDURE — 85025 COMPLETE CBC W/AUTO DIFF WBC: CPT | Performed by: STUDENT IN AN ORGANIZED HEALTH CARE EDUCATION/TRAINING PROGRAM

## 2019-04-03 RX ADMIN — SODIUM CHLORIDE 1000 ML: 9 INJECTION, SOLUTION INTRAVENOUS at 11:08

## 2019-04-03 ASSESSMENT — MIFFLIN-ST. JEOR: SCORE: 2031.84

## 2019-04-03 NOTE — TELEPHONE ENCOUNTER
"S-(situation):states last few days feeling dizzy.  Things get super blurry and sometimes sees just black\". Has not passed out or fallen down  No change in diet and taking fluids.   Marijuana use he says, but rare and says only uses when has a migraine.   Feels \"dizzy and lightheaded and drained weak\".   Denies vomiting, diarrhea or fever. Is nauseated when this happens  He says this has happened before  Nothing he does brings this on or makes it better.    B-(background): not taking meds for ADHD anymore.  Has not been to an eye doctor.    A-(assessment): light headedness of unknown etiolgy    R-(recommendations): advised to go to the ED with a   Soheila Ardon RN      "

## 2019-04-03 NOTE — ED TRIAGE NOTES
Dizziness for the last 3 days and yesterday was feeling faint.  Describes feeling more tired then normal.  Denies diarrhea, headache, or vomiting.

## 2019-04-03 NOTE — ED AVS SNAPSHOT
[facility] Emergency [facility]  [address] [place] [address]  Phone:  [phone]  Fax:  [phone]                                    Anthony Edwards   MRN: [id number]    Department:  [facility]   Date of Visit:  4/3/2019           After Visit Summary Signature Page    I have received my discharge instructions, and my questions have been answered. I have discussed any challenges I see with this plan with the nurse or doctor.    ..........................................................................................................................................  Patient/Patient Representative Signature      ..........................................................................................................................................  Patient Representative Print Name and Relationship to Patient    ..................................................               ................................................  Date                                   Time    ..........................................................................................................................................  Reviewed by Signature/Title    ...................................................              ..............................................  Date                                               Time          22EPIC Rev 08/18

## 2019-04-03 NOTE — ED PROVIDER NOTES
"  History     Chief Complaint   Patient presents with     Dizziness     HPI  Anthony Edwards is a 23 year old male with past medical history which includes ADHD, major depressive disorder, stimulant dependence who presents for evaluation of 3 days of lightheadedness.  Patient explains that over the past 3 days he has felt dizzy and near-faint on multiple occasions but often times when standing abruptly or ambulatory.  It feels similar to an incident he suffered from \"in high school\" nearly 5 years ago in which he states that he actually fainted.  Associated symptoms include fatigue and generalized weakness but denies focal weakness.  He denies recent fever, chills, nasal congestion, headache, neck stiffness, chest pain, palpitations, shortness of breath, leg swelling, or other concerns.  He continues to eat and drink well and does not feel dehydrated.    Allergies:  No Known Allergies    Problem List:    Patient Active Problem List    Diagnosis Date Noted     Attention deficit hyperactivity disorder (ADHD), combined type 12/19/2016     Priority: Medium     Major depressive disorder, single episode, mild (H) 12/19/2016     Priority: Medium     Stimulant dependence (H) 12/19/2016     Priority: Medium     Atopic rhinitis 05/20/2013     Priority: Medium     Allergic rhinitis 05/20/2013     Priority: Medium     ADHD (attention deficit hyperactivity disorder) 11/15/2011     Priority: Medium        Past Medical History:    No past medical history on file.    Past Surgical History:    No past surgical history on file.    Family History:    Family History   Problem Relation Age of Onset     Heart Failure Paternal Grandfather        Social History:  Marital Status:  Single [1]  Social History     Tobacco Use     Smoking status: Current Some Day Smoker     Smokeless tobacco: Never Used   Substance Use Topics     Alcohol use: No     Alcohol/week: 0.0 oz     Drug use: No        Medications:      albuterol (PROAIR HFA, " "PROVENTIL HFA, VENTOLIN HFA) 108 (90 BASE) MCG/ACT inhaler   cyclobenzaprine (FLEXERIL) 10 MG tablet   cyclobenzaprine (FLEXERIL) 10 MG tablet   HYDROcodone-acetaminophen (NORCO) 5-325 MG tablet   ibuprofen (ADVIL,MOTRIN) 200 MG tablet   loratadine (CLARITIN) 10 MG tablet   montelukast (SINGULAIR) 10 MG tablet   naproxen (NAPROSYN) 500 MG tablet   olopatadine (PATANOL) 0.1 % ophthalmic solution   order for DME   order for DME         Review of Systems  Constitutional:  Negative for fever or recent illness.  HENT:  Negative for sore throat or nasal congestion.  Eye:  Negative for double vision or changes from baseline.  Cardiovascular:  Negative for chest pain or palpitations.  Respiratory:  Negative for cough or shortness of breath.  Gastrointestinal:  Negative for abdominal pain, vomiting, or diarrhea.    Neurological: Positive for lightheadedness.  Negative for headache.    All others reviewed and are negative.      Physical Exam   BP: 131/64  Pulse: 63  Heart Rate: 59  Temp: 97.6  F (36.4  C)  Resp: 17  Height: 177.8 cm (5' 10\")  Weight: 102.1 kg (225 lb)  SpO2: 98 %  Lying Orthostatic BP: 116/62  Lying Orthostatic Pulse: 56 bpm  Sitting Orthostatic BP: 136/52  Sitting Orthostatic Pulse: 57 bpm  Standing Orthostatic BP: 100/65  Standing Orthostatic Pulse: 58 bpm      Physical Exam  Constitutional:  Well developed, well nourished.  Appears nontoxic and in no acute distress.   HENT:  Normocephalic and atraumatic.  Symmetric in appearance.  Eyes:  Conjunctivae are normal.  EOMI denies diplopia.  PERRLA.  Negative for nystagmus.  Neck:  Neck supple.  Cardiovascular:  No cyanosis.  RRR.  No audible murmurs noted.  No lower extremity edema or asymmetry.   Respiratory:  Effort normal without sign of respiratory distress.  CTAB without diminished regions.  Gastrointestinal:  Soft nondistended abdomen.  Nontender and without guarding.  No rigidity or rebound tenderness.  Negative Melchor's sign.  Negative McBurney's point. "   Genitourinary:  Noncontributory.   Musculoskeletal:  Moves extremities spontaneously.  Neurological:  Patient is alert.  Skin:  Skin is warm and dry.  Psychiatric:  Normal mood and affect.      ED Course        Procedures                 EKG Interpretation:      Interpreted by: Denys Rosado  Time reviewed: Upon arrival     Symptoms at time of EKG: Asymptomatic   Rhythm: Sinus  Rate: Normal  Axis: Normal    Conduction: None atypical   ST Segments/ T Waves: No pathologic ST-elevations or T-wave abnormalities.  Q Waves: None  Comparison to prior: None available    Clinical Impression: No sign of ischemia or arrhythmia        Critical Care time:  none               Results for orders placed or performed during the hospital encounter of 04/03/19 (from the past 24 hour(s))   CBC with platelets differential   Result Value Ref Range    WBC 6.7 4.0 - 11.0 10e9/L    RBC Count 4.98 4.4 - 5.9 10e12/L    Hemoglobin 14.9 13.3 - 17.7 g/dL    Hematocrit 45.8 40.0 - 53.0 %    MCV 92 78 - 100 fl    MCH 29.9 26.5 - 33.0 pg    MCHC 32.5 31.5 - 36.5 g/dL    RDW 12.9 10.0 - 15.0 %    Platelet Count 205 150 - 450 10e9/L    Diff Method Automated Method     % Neutrophils 56.6 %    % Lymphocytes 30.4 %    % Monocytes 8.6 %    % Eosinophils 3.1 %    % Basophils 0.9 %    % Immature Granulocytes 0.4 %    Nucleated RBCs 0 0 /100    Absolute Neutrophil 3.8 1.6 - 8.3 10e9/L    Absolute Lymphocytes 2.1 0.8 - 5.3 10e9/L    Absolute Monocytes 0.6 0.0 - 1.3 10e9/L    Absolute Basophils 0.1 0.0 - 0.2 10e9/L    Abs Immature Granulocytes 0.0 0 - 0.4 10e9/L    Absolute Nucleated RBC 0.0    Comprehensive metabolic panel   Result Value Ref Range    Sodium 141 133 - 144 mmol/L    Potassium 3.8 3.4 - 5.3 mmol/L    Chloride 108 94 - 109 mmol/L    Carbon Dioxide 25 20 - 32 mmol/L    Anion Gap 8 3 - 14 mmol/L    Glucose 83 70 - 99 mg/dL    Urea Nitrogen 12 7 - 30 mg/dL    Creatinine 0.80 0.66 - 1.25 mg/dL    GFR Estimate >90 >60 mL/min/[1.73_m2]    GFR  Estimate If Black >90 >60 mL/min/[1.73_m2]    Calcium 8.9 8.5 - 10.1 mg/dL    Bilirubin Total 0.3 0.2 - 1.3 mg/dL    Albumin 4.1 3.4 - 5.0 g/dL    Protein Total 7.5 6.8 - 8.8 g/dL    Alkaline Phosphatase 97 40 - 150 U/L    ALT 44 0 - 70 U/L    AST 19 0 - 45 U/L   Troponin I   Result Value Ref Range    Troponin I ES <0.015 0.000 - 0.045 ug/L       Medications   0.9% sodium chloride BOLUS (0 mLs Intravenous Stopped 4/3/19 1200)       Negative for [place] Syncope Rule Criteria:      PMH of CHF   Hematocrit <30   EKG Abnormalities   Shortness of Breath   Systolic BP < 90 mmHg    The patient specifically denies recent bleeding, including GI.  Patient also denies family history of arrhythmias, hypertrophic cardiomyopathy, or young unexpected deaths.      Assessments & Plan (with Medical Decision Making)   Anthony Edwards is a 23 year old male who presents to the department for complaint of 3 days of dizziness, describes as near syncope opposed to vertiginous.  Differential diagnosis included anemia, electrolyte abnormality, intoxication, CVA, intercranial hemorrhage and pulmonary embolism.  EKG is without evidence of WPW, Brugada, long-QT syndrome, hypertrophic cardiomyopathy, or heart block.  No sign of arrhythmia on cardiac monitor.  He denies symptoms of orthostasis with standing in the department.  At this time the etiology of symptoms is difficult to discern but could represent vasovagal symptoms, recommend follow-up with primary care provider for reevaluation and consideration for further testing such as Holter monitoring.  He is agreeable with the discharge plan we discussed including follow up and return instructions for developing symptoms or any other concerns.      Disclaimer:  This note consists of symbols derived from keyboarding, dictation, and/or voice recognition software.  As a result, there may be errors in the script that have gone undetected.  Please consider this when interpreting  information found in the chart.        I have reviewed the nursing notes.    I have reviewed the findings, diagnosis, plan and need for follow up with the patient.          Medication List      There are no discharge medications for this visit.         Final diagnoses:   Lightheadedness       4/3/2019   [facility] EMERGENCY DEPARTMENT     Denys Rosado DO  04/03/19 1318

## 2019-04-08 ENCOUNTER — OFFICE VISIT (OUTPATIENT)
Dept: FAMILY MEDICINE | Facility: CLINIC | Age: 24
End: 2019-04-08
Payer: COMMERCIAL

## 2019-04-08 VITALS
BODY MASS INDEX: 32.08 KG/M2 | RESPIRATION RATE: 16 BRPM | WEIGHT: 223.6 LBS | TEMPERATURE: 97.5 F | OXYGEN SATURATION: 98 % | SYSTOLIC BLOOD PRESSURE: 112 MMHG | DIASTOLIC BLOOD PRESSURE: 64 MMHG | HEART RATE: 75 BPM

## 2019-04-08 DIAGNOSIS — R42 DIZZINESS: Primary | ICD-10-CM

## 2019-04-08 LAB — TSH SERPL DL<=0.005 MIU/L-ACNC: 1.33 MU/L (ref 0.4–4)

## 2019-04-08 PROCEDURE — 84443 ASSAY THYROID STIM HORMONE: CPT | Performed by: PHYSICIAN ASSISTANT

## 2019-04-08 PROCEDURE — 36415 COLL VENOUS BLD VENIPUNCTURE: CPT | Performed by: PHYSICIAN ASSISTANT

## 2019-04-08 PROCEDURE — 99214 OFFICE O/P EST MOD 30 MIN: CPT | Performed by: PHYSICIAN ASSISTANT

## 2019-04-08 RX ORDER — MECLIZINE HYDROCHLORIDE 25 MG/1
25 TABLET ORAL 3 TIMES DAILY PRN
Qty: 30 TABLET | Refills: 1 | Status: SHIPPED | OUTPATIENT
Start: 2019-04-08

## 2019-04-08 ASSESSMENT — ANXIETY QUESTIONNAIRES
3. WORRYING TOO MUCH ABOUT DIFFERENT THINGS: SEVERAL DAYS
GAD7 TOTAL SCORE: 5
GAD7 TOTAL SCORE: 5
7. FEELING AFRAID AS IF SOMETHING AWFUL MIGHT HAPPEN: NOT AT ALL
GAD7 TOTAL SCORE: 5
6. BECOMING EASILY ANNOYED OR IRRITABLE: SEVERAL DAYS
4. TROUBLE RELAXING: SEVERAL DAYS
2. NOT BEING ABLE TO STOP OR CONTROL WORRYING: NOT AT ALL
5. BEING SO RESTLESS THAT IT IS HARD TO SIT STILL: SEVERAL DAYS
3. WORRYING TOO MUCH ABOUT DIFFERENT THINGS: SEVERAL DAYS
4. TROUBLE RELAXING: SEVERAL DAYS
6. BECOMING EASILY ANNOYED OR IRRITABLE: SEVERAL DAYS
1. FEELING NERVOUS, ANXIOUS, OR ON EDGE: SEVERAL DAYS
2. NOT BEING ABLE TO STOP OR CONTROL WORRYING: NOT AT ALL
5. BEING SO RESTLESS THAT IT IS HARD TO SIT STILL: SEVERAL DAYS
1. FEELING NERVOUS, ANXIOUS, OR ON EDGE: SEVERAL DAYS
7. FEELING AFRAID AS IF SOMETHING AWFUL MIGHT HAPPEN: NOT AT ALL
7. FEELING AFRAID AS IF SOMETHING AWFUL MIGHT HAPPEN: NOT AT ALL
GAD7 TOTAL SCORE: 5

## 2019-04-08 ASSESSMENT — PAIN SCALES - GENERAL: PAINLEVEL: MODERATE PAIN (5)

## 2019-04-08 ASSESSMENT — PATIENT HEALTH QUESTIONNAIRE - PHQ9
SUM OF ALL RESPONSES TO PHQ QUESTIONS 1-9: 4
SUM OF ALL RESPONSES TO PHQ QUESTIONS 1-9: 4
10. IF YOU CHECKED OFF ANY PROBLEMS, HOW DIFFICULT HAVE THESE PROBLEMS MADE IT FOR YOU TO DO YOUR WORK, TAKE CARE OF THINGS AT HOME, OR GET ALONG WITH OTHER PEOPLE: SOMEWHAT DIFFICULT
SUM OF ALL RESPONSES TO PHQ QUESTIONS 1-9: 4

## 2019-04-08 NOTE — NURSING NOTE
"Chief Complaint   Patient presents with     ER F/U     Medication Reconciliation       Initial /64 (BP Location: Right arm, Patient Position: Chair, Cuff Size: Adult Large)   Pulse 75   Temp 97.5  F (36.4  C) (Tympanic)   Resp 16   Wt 101.4 kg (223 lb 9.6 oz)   SpO2 98%   BMI 32.08 kg/m   Estimated body mass index is 32.08 kg/m  as calculated from the following:    Height as of 4/3/19: 1.778 m (5' 10\").    Weight as of this encounter: 101.4 kg (223 lb 9.6 oz).    Patient presents to the clinic using No DME    Health Maintenance that is potentially due pending provider review:  PHQ9    Possibly completing today per provider review.- completed    Is there anyone who you would like to be able to receive your results? No  If yes have patient fill out MIRNA  Soheila Barnett CMA    "

## 2019-04-08 NOTE — PROGRESS NOTES
SUBJECTIVE:                                                    Anthony Edwards is a 23 year old male who presents to clinic today for the following health issues:  Med rec- All allergy type meds on list but currently not taking.  Usually takes as needed during allergy season in the spring and fall.      ED/UC Followup:    Facility:  [facility] ED  Date of visit: 4/3/19  Reason for visit: Lightheadedness . Also hx of migraine 2-3 times weekly.  Current Status: feeling somewhat improved- still having some episodes of vertigo       Patient describes dizziness as sometimes feeling like he might faint and other times it feels as though the room is spinning. He is not sure if dizziness is related to head position. He cannot think of anything in particular that makes it better or worse. He was seen in the ED and it was recommended he have a holter monitor placed. No headache at this time. His friend instructed him to try meclizine and he thinks it might be slightly helpful.     Problem list and histories reviewed & adjusted, as indicated.  Additional history: as documented    Patient Active Problem List   Diagnosis     Atopic rhinitis     Attention deficit hyperactivity disorder (ADHD), combined type     Major depressive disorder, single episode, mild (H)     Stimulant dependence (H)     Allergic rhinitis     ADHD (attention deficit hyperactivity disorder)     History reviewed. No pertinent surgical history.    Social History     Tobacco Use     Smoking status: Current Some Day Smoker     Types: Cigarettes     Smokeless tobacco: Never Used   Substance Use Topics     Alcohol use: Yes     Alcohol/week: 0.0 oz     Comment: socia;     Family History   Problem Relation Age of Onset     Heart Failure Paternal Grandfather          Current Outpatient Medications   Medication Sig Dispense Refill     cyclobenzaprine (FLEXERIL) 10 MG tablet Take 1 tablet (10 mg) by mouth 3 times daily as needed for muscle spasms 30  tablet 0     ibuprofen (ADVIL,MOTRIN) 200 MG tablet Take 400 mg by mouth       meclizine (ANTIVERT) 25 MG tablet Take 1 tablet (25 mg) by mouth 3 times daily as needed for dizziness 30 tablet 1     albuterol (PROAIR HFA, PROVENTIL HFA, VENTOLIN HFA) 108 (90 BASE) MCG/ACT inhaler Inhale 2 puffs into the lungs every 6 hours as needed for shortness of breath / dyspnea or wheezing 1 Inhaler 0     loratadine (CLARITIN) 10 MG tablet Take 10 mg by mouth       montelukast (SINGULAIR) 10 MG tablet Take 10 mg by mouth       naproxen (NAPROSYN) 500 MG tablet Take 1 tablet (500 mg) by mouth 2 times daily (with meals) 60 tablet 1     olopatadine (PATANOL) 0.1 % ophthalmic solution 1 drop       No Known Allergies  Labs reviewed in EPIC    ROS:  Review of Systems   Constitutional: Negative for chills, fever and malaise/fatigue.   HENT: Negative for congestion, ear pain and sore throat.    Eyes: Negative for blurred vision, discharge and redness.   Respiratory: Negative for cough, shortness of breath and wheezing.    Cardiovascular: Negative for chest pain and palpitations.   Gastrointestinal: Negative for abdominal pain, diarrhea, nausea and vomiting.   Musculoskeletal: Negative for joint pain and myalgias.   Skin: Negative for rash.   Neurological: Positive for dizziness. Negative for headaches.         OBJECTIVE:     /64 (BP Location: Right arm, Patient Position: Chair, Cuff Size: Adult Large)   Pulse 75   Temp 97.5  F (36.4  C) (Tympanic)   Resp 16   Wt 101.4 kg (223 lb 9.6 oz)   SpO2 98%   BMI 32.08 kg/m    Body mass index is 32.08 kg/m .    Physical Exam   Constitutional: He appears well-developed and well-nourished. No distress.   HENT:   Head: Normocephalic and atraumatic.   Right Ear: Tympanic membrane and ear canal normal.   Left Ear: Tympanic membrane and ear canal normal.   Mouth/Throat: Oropharynx is clear and moist.   When supine patient reported dizziness (room spinning) when turning his head to the left  but not the right.    Eyes: Pupils are equal, round, and reactive to light. Conjunctivae are normal.   Cardiovascular: Normal rate and regular rhythm.   Pulmonary/Chest: Effort normal and breath sounds normal.   Neurological: He has normal strength and normal reflexes. No cranial nerve deficit or sensory deficit. He displays a negative Romberg sign.   Skin: Skin is warm and dry. No rash noted.   Psychiatric: He has a normal mood and affect. His behavior is normal.         Diagnostic Test Results:  TSH- pending     ASSESSMENT/PLAN:       1. Dizziness  Patient reporting vertigo symptoms and feelings of near syncope. Will check TSH and have him schedule holter monitor. Will try treatment with meclizine every 8hrs as needed. Can also try Dr. Evy Muller's self vertigo maneuver on youtube for possible positional vertigo. Will have him follow up with primary care provider to discuss results of holter monitor. Discussed in detail symptoms that would warrant emergent evaluation in the ED. Patient agrees with plan and will follow up with primary care provider.     - Zio Patch Holter Adult Pediatric Greater than 48 hrs; Future  - TSH with free T4 reflex  - meclizine (ANTIVERT) 25 MG tablet; Take 1 tablet (25 mg) by mouth 3 times daily as needed for dizziness  Dispense: 30 tablet; Refill: 1     Una Barclay PA-C  [facility]

## 2019-04-09 ASSESSMENT — ENCOUNTER SYMPTOMS
BLURRED VISION: 0
COUGH: 0
SHORTNESS OF BREATH: 0
ABDOMINAL PAIN: 0
EYE REDNESS: 0
VOMITING: 0
DIARRHEA: 0
HEADACHES: 0
CHILLS: 0
FEVER: 0
EYE DISCHARGE: 0
NAUSEA: 0
SORE THROAT: 0
WHEEZING: 0
DIZZINESS: 1
MYALGIAS: 0
PALPITATIONS: 0

## 2019-04-09 ASSESSMENT — PATIENT HEALTH QUESTIONNAIRE - PHQ9: SUM OF ALL RESPONSES TO PHQ QUESTIONS 1-9: 4

## 2019-04-09 ASSESSMENT — ANXIETY QUESTIONNAIRES: GAD7 TOTAL SCORE: 5

## 2019-04-17 ENCOUNTER — HOSPITAL ENCOUNTER (OUTPATIENT)
Dept: CARDIOLOGY | Facility: CLINIC | Age: 24
Discharge: HOME | End: 2019-04-17
Attending: PHYSICIAN ASSISTANT | Admitting: PHYSICIAN ASSISTANT
Payer: COMMERCIAL

## 2019-04-17 DIAGNOSIS — R42 DIZZINESS: ICD-10-CM

## 2019-04-17 PROCEDURE — 0298T ZIO PATCH HOLTER ADULT PEDIATRIC GREATER THAN 48 HRS: CPT | Performed by: INTERNAL MEDICINE

## 2019-04-17 PROCEDURE — 0296T ZIO PATCH HOLTER ADULT PEDIATRIC GREATER THAN 48 HRS: CPT

## 2019-04-25 DIAGNOSIS — J01.90 ACUTE SINUSITIS WITH SYMPTOMS > 10 DAYS: ICD-10-CM

## 2019-04-25 RX ORDER — OLOPATADINE HYDROCHLORIDE 1 MG/ML
1 SOLUTION/ DROPS OPHTHALMIC 2 TIMES DAILY
Qty: 5 ML | Refills: 1 | Status: SHIPPED | OUTPATIENT
Start: 2019-04-25

## 2019-04-25 RX ORDER — FLUTICASONE PROPIONATE 50 MCG
SPRAY, SUSPENSION (ML) NASAL
Qty: 9.9 ML | Refills: 1 | Status: SHIPPED | OUTPATIENT
Start: 2019-04-25

## 2019-04-25 NOTE — TELEPHONE ENCOUNTER
"Pt has seasonal allergies. He says he has sinus build up and headache. He wants to know if he can get eye drops and  Flonase that he has gotten in the past without coming in.     Requested Prescriptions   Pending Prescriptions Disp Refills     olopatadine (PATANOL) 0.1 % ophthalmic solution       Si drop       Miscellaneous Opthalmic Allergy Drops Protocol Passed - 2019  1:41 PM        Passed - Patient is age 4 or older        Passed - Recent (12 mo) or future (30 days) visit within the authorizing provider's specialty     Patient had office visit in the last 12 months or has a visit in the next 30 days with authorizing provider or within the authorizing provider's specialty.  See \"Patient Info\" tab in inbasket, or \"Choose Columns\" in Meds & Orders section of the refill encounter.              Passed - Medication is active on med list        fluticasone (FLONASE) 50 MCG/ACT nasal spray         Inhaled Steroids Protocol Failed - 2019  1:41 PM        Failed - Medication is active on med list        Passed - Patient is age 12 or older        Passed - Recent (12 mo) or future (30 days) visit within the authorizing provider's specialty     Patient had office visit in the last 12 months or has a visit in the next 30 days with authorizing provider or within the authorizing provider's specialty.  See \"Patient Info\" tab in inbasket, or \"Choose Columns\" in Meds & Orders section of the refill encounter.              Patanol 0.1 % ophthalmic solution      Last Written Prescription Date: Patient Reported  Last Fill Quantity:  ,   # refills:    Last Office Visit: 19  Future Office visit:       Flonase      Last Written Prescription Date:  17  Last Fill Quantity: 1,   # refills: 0  Last Office Visit: 19  Future Office visit:         "

## 2019-05-03 ENCOUNTER — TELEPHONE (OUTPATIENT)
Dept: FAMILY MEDICINE | Facility: CLINIC | Age: 24
End: 2019-05-03

## 2019-08-15 ENCOUNTER — OFFICE VISIT (OUTPATIENT)
Dept: FAMILY MEDICINE | Facility: CLINIC | Age: 24
End: 2019-08-15
Payer: COMMERCIAL

## 2019-08-15 VITALS
BODY MASS INDEX: 31.18 KG/M2 | SYSTOLIC BLOOD PRESSURE: 108 MMHG | HEIGHT: 70 IN | DIASTOLIC BLOOD PRESSURE: 60 MMHG | HEART RATE: 54 BPM | OXYGEN SATURATION: 96 % | RESPIRATION RATE: 20 BRPM | WEIGHT: 217.8 LBS | TEMPERATURE: 97.5 F

## 2019-08-15 DIAGNOSIS — G43.809 OTHER MIGRAINE WITHOUT STATUS MIGRAINOSUS, NOT INTRACTABLE: Primary | ICD-10-CM

## 2019-08-15 DIAGNOSIS — F32.1 MODERATE MAJOR DEPRESSION (H): ICD-10-CM

## 2019-08-15 PROCEDURE — 99214 OFFICE O/P EST MOD 30 MIN: CPT | Performed by: FAMILY MEDICINE

## 2019-08-15 RX ORDER — SUMATRIPTAN 50 MG/1
50 TABLET, FILM COATED ORAL
Qty: 6 TABLET | Refills: 11 | Status: SHIPPED | OUTPATIENT
Start: 2019-08-15

## 2019-08-15 ASSESSMENT — PATIENT HEALTH QUESTIONNAIRE - PHQ9
5. POOR APPETITE OR OVEREATING: SEVERAL DAYS
SUM OF ALL RESPONSES TO PHQ QUESTIONS 1-9: 11

## 2019-08-15 ASSESSMENT — ANXIETY QUESTIONNAIRES
6. BECOMING EASILY ANNOYED OR IRRITABLE: MORE THAN HALF THE DAYS
GAD7 TOTAL SCORE: 12
1. FEELING NERVOUS, ANXIOUS, OR ON EDGE: SEVERAL DAYS
7. FEELING AFRAID AS IF SOMETHING AWFUL MIGHT HAPPEN: NEARLY EVERY DAY
2. NOT BEING ABLE TO STOP OR CONTROL WORRYING: MORE THAN HALF THE DAYS
5. BEING SO RESTLESS THAT IT IS HARD TO SIT STILL: SEVERAL DAYS
3. WORRYING TOO MUCH ABOUT DIFFERENT THINGS: MORE THAN HALF THE DAYS

## 2019-08-15 ASSESSMENT — MIFFLIN-ST. JEOR: SCORE: 1999.18

## 2019-08-15 NOTE — NURSING NOTE
"Chief Complaint   Patient presents with     Depression     Headache       Initial /60 (BP Location: Right arm, Patient Position: Sitting, Cuff Size: Adult Large)   Pulse 54   Temp 97.5  F (36.4  C) (Tympanic)   Resp 20   Ht 1.778 m (5' 10\")   Wt 98.8 kg (217 lb 12.8 oz)   SpO2 96%   BMI 31.25 kg/m   Estimated body mass index is 31.25 kg/m  as calculated from the following:    Height as of this encounter: 1.778 m (5' 10\").    Weight as of this encounter: 98.8 kg (217 lb 12.8 oz).    Patient presents to the clinic using No DME    Health Maintenance that is potentially due pending provider review:  NONE    n/a    Is there anyone who you would like to be able to receive your results? No  If yes have patient fill out MIRNA    Grace Suero CMA    "

## 2019-08-15 NOTE — PROGRESS NOTES
"Depression -Needs refills  Migraine-would like to discuss medication    S: Anthony Edwards is a 23 year old male with migraines.     Also with depression.     Migraines classic, years.  Nausea, photophobia, intermittent.  Sleep, rest, dark  Room.  Minimal aura, some eye pain before they start    Daily caffeine, 1-2.  Uses excedrin.  Willing to try tapering off, seems like when he doesn't have his caffeine, migraines more likely    Mood down for a couple months.  Enoc , feeling down.  Has been on ssri in past, on citalopram.  Didn't think it did much    Problem list, med list, additional histories reviewed and updated, as indicated.      O:/60 (BP Location: Right arm, Patient Position: Sitting, Cuff Size: Adult Large)   Pulse 54   Temp 97.5  F (36.4  C) (Tympanic)   Resp 20   Ht 1.778 m (5' 10\")   Wt 98.8 kg (217 lb 12.8 oz)   SpO2 96%   BMI 31.25 kg/m    GEN: Alert and oriented, in no acute distress  Gait fine  Mood OK today    A: migraines, chronic, not well treated      Depression, moderate, new      P: zoloft.  Self cares.  Back in 2 months  Taper off caffeine and excedrin  imitrex prn onset of HA    See how all this goes.       "

## 2019-08-16 ASSESSMENT — ANXIETY QUESTIONNAIRES: GAD7 TOTAL SCORE: 12

## 2019-08-22 ENCOUNTER — OFFICE VISIT (OUTPATIENT)
Dept: FAMILY MEDICINE | Facility: CLINIC | Age: 24
End: 2019-08-22
Payer: COMMERCIAL

## 2019-08-22 VITALS
HEART RATE: 98 BPM | SYSTOLIC BLOOD PRESSURE: 112 MMHG | RESPIRATION RATE: 17 BRPM | TEMPERATURE: 98.9 F | BODY MASS INDEX: 31.26 KG/M2 | HEIGHT: 70 IN | WEIGHT: 218.4 LBS | DIASTOLIC BLOOD PRESSURE: 50 MMHG | OXYGEN SATURATION: 97 %

## 2019-08-22 DIAGNOSIS — J02.9 SORE THROAT: ICD-10-CM

## 2019-08-22 DIAGNOSIS — B34.9 VIRAL SYNDROME: ICD-10-CM

## 2019-08-22 DIAGNOSIS — R09.82 POST-NASAL DRIP: Primary | ICD-10-CM

## 2019-08-22 LAB
DEPRECATED S PYO AG THROAT QL EIA: NORMAL
SPECIMEN SOURCE: NORMAL

## 2019-08-22 PROCEDURE — 87880 STREP A ASSAY W/OPTIC: CPT | Performed by: NURSE PRACTITIONER

## 2019-08-22 PROCEDURE — 99213 OFFICE O/P EST LOW 20 MIN: CPT | Performed by: NURSE PRACTITIONER

## 2019-08-22 PROCEDURE — 87081 CULTURE SCREEN ONLY: CPT | Performed by: NURSE PRACTITIONER

## 2019-08-22 ASSESSMENT — MIFFLIN-ST. JEOR: SCORE: 1996.91

## 2019-08-22 NOTE — PATIENT INSTRUCTIONS
"Increase rest and fluids. Tylenol and/or Ibuprofen for comfort. Cool mist vaporizer. If your symptoms worsen or do not resolve follow up with your primary care provider in 1 week and sooner if needed.     Strep culture is pending will result in 24-48 hours.  If it is positive and change in treatment plan will contact you.           Mucinex 600 mg 12 hour formula for ear, head and chest congestion.  It can also thin post nasal drip which can cause a cough and sore throat.    Indications for emergent return to emergency department discussed with patient, who verbalized good understanding and agreement.  Patient understands the limitations of today's evaluation.           Patient Education     Viral Syndrome (Adult)  A viral illness may cause a number of symptoms such as fever. Other symptoms depend on the part of the body that the virus affects. If it settles in your nose, throat, and lungs, it may cause cough, sore throat, congestion, runny nose, headache, earache and other ear symptoms, or shortness of breath. If it settles in your stomach and intestinal tract, it may cause nausea, vomiting, cramping, and diarrhea. Sometimes it causes generalized symptoms like \"aching all over,\" feeling tired, loss of energy, or loss of appetite.  A viral illness usually lasts anywhere from several days to several weeks, but sometimes it lasts longer. In some cases, a more serious infection can look like a viral syndrome in the first few days of the illness. You may need another exam and additional tests to know the difference. Watch for the warning signs listed below for when to seek medical advice.  Home care  Follow these guidelines for taking care of yourself at home:    If symptoms are severe, rest at home for the first 2 to 3 days.    Stay away from cigarette smoke - both your smoke and the smoke from others.    You may use over-the-counter acetaminophen or ibuprofen for fever, muscle aching, and headache, unless another " medicine was prescribed for this. If you have chronic liver or kidney disease or ever had a stomach ulcer or gastrointestinal bleeding, talk with your healthcare provider before using these medicines. No one who is younger than 18 and ill with a fever should take aspirin. It may cause severe disease or death.    Your appetite may be poor, so a light diet is fine. Avoid dehydration by drinking 8 to 12, 8-ounce glasses of fluids each day. This may include water; orange juice; lemonade; apple, grape, and cranberry juice; clear fruit drinks; electrolyte replacement and sports drinks; and decaffeinated teas and coffee. If you have been diagnosed with a kidney disease, ask your healthcare provider how much and what types of fluids you should drink to prevent dehydration. If you have kidney disease, drinking too much fluid can cause it build up in the your body and be dangerous to your health.    Over-the-counter remedies won't shorten the length of the illness but may be helpful for symptoms such as cough, sore throat, nasal and sinus congestion, or diarrhea. Don't use decongestants if you have high blood pressure.  Follow-up care  Follow up with your healthcare provider if you do not improve over the next week.  Call 911  Call 911 if any of the following occur:    Convulsion    Feeling weak, dizzy, or like you are going to faint    Chest pain, or more than mild shortness of breath  When to seek medical advice  Call your healthcare provider right away if any of these occur:    Cough with lots of colored sputum (mucus) or blood in your sputum    Chest pain, shortness of breath, wheezing, or trouble breathing    Severe headache; face, neck, or ear pain    Severe, constant pain in the lower right side of your belly (abdominal)    Continued vomiting (can t keep liquids down)    Frequent diarrhea (more than 5 times a day); blood (red or black color) or mucus in diarrhea    Feeling weak, dizzy, or like you are going to  faint    Extreme thirst    Fever of 100.4 F (38 C) or higher, or as directed by your healthcare provider  Date Last Reviewed: 4/1/2018 2000-2018 [employer]. [address] [place] [address], [place] [address]. All rights reserved. This information is not intended as a substitute for professional medical care. Always follow your healthcare professional's instructions.

## 2019-08-22 NOTE — NURSING NOTE
"Chief Complaint   Patient presents with     Pharyngitis       Initial /50 (BP Location: Right arm, Patient Position: Chair, Cuff Size: Adult Large)   Pulse 98   Temp 98.9  F (37.2  C) (Tympanic)   Resp 17   Ht 1.778 m (5' 10\")   Wt 99.1 kg (218 lb 6.4 oz)   SpO2 97%   BMI 31.34 kg/m   Estimated body mass index is 31.34 kg/m  as calculated from the following:    Height as of this encounter: 1.778 m (5' 10\").    Weight as of this encounter: 99.1 kg (218 lb 6.4 oz).    Patient presents to the clinic using No DME    Health Maintenance that is potentially due pending provider review:  NONE    n/a    Is there anyone who you would like to be able to receive your results? No  If yes have patient fill out MIRNA    "

## 2019-08-22 NOTE — PROGRESS NOTES
Subjective     Anthony Edwards is a 24 year old male who presents to clinic today for the following health issues:    HPI   ENT Symptoms             Symptoms: cc Present Absent Comment   Fever/Chills   x    Fatigue  x     Muscle Aches  x     Eye Irritation  x     Sneezing   x    Nasal Shabbir/Drg  x     Sinus Pressure/Pain  x     Loss of smell  x     Dental pain   x    Sore Throat  x     Swollen Glands  x     Ear Pain/Fullness  x     Cough  x     Wheeze  x     Chest Pain  x     Shortness of breath  x     Rash   x    Other  x  Neck pain, vision issues     Symptom duration:  4 days   Symptom severity:  moderate   Treatments tried:  dayquil, nyquil   Contacts:  girlfriend's child           Patient Active Problem List   Diagnosis     Atopic rhinitis     Attention deficit hyperactivity disorder (ADHD), combined type     Major depressive disorder, single episode, mild (H)     Stimulant dependence (H)     Allergic rhinitis     ADHD (attention deficit hyperactivity disorder)     History reviewed. No pertinent surgical history.    Social History     Tobacco Use     Smoking status: Current Some Day Smoker     Types: Cigarettes     Smokeless tobacco: Never Used   Substance Use Topics     Alcohol use: Yes     Alcohol/week: 0.0 oz     Comment: socia;     Family History   Problem Relation Age of Onset     Heart Failure Paternal Grandfather          Current Outpatient Medications   Medication Sig Dispense Refill     cyclobenzaprine (FLEXERIL) 10 MG tablet Take 1 tablet (10 mg) by mouth 3 times daily as needed for muscle spasms 30 tablet 0     fluticasone (FLONASE) 50 MCG/ACT nasal spray USE ONE TO TWO SPRAY(S) IN EACH NOSTRIL ONCE DAILY 9.9 mL 1     ibuprofen (ADVIL,MOTRIN) 200 MG tablet Take 400 mg by mouth       loratadine (CLARITIN) 10 MG tablet Take 10 mg by mouth       meclizine (ANTIVERT) 25 MG tablet Take 1 tablet (25 mg) by mouth 3 times daily as needed for dizziness 30 tablet 1     montelukast (SINGULAIR) 10 MG tablet  "Take 10 mg by mouth       naproxen (NAPROSYN) 500 MG tablet Take 1 tablet (500 mg) by mouth 2 times daily (with meals) 60 tablet 1     olopatadine (PATANOL) 0.1 % ophthalmic solution Place 1 drop into both eyes 2 times daily 5 mL 1     sertraline (ZOLOFT) 50 MG tablet Take 1 tablet (50 mg) by mouth daily 30 tablet 2     SUMAtriptan (IMITREX) 50 MG tablet Take 1 tablet (50 mg) by mouth at onset of headache for migraine May repeat in 2 hours. Max 4 tablets/24 hours. 6 tablet 11     albuterol (PROAIR HFA, PROVENTIL HFA, VENTOLIN HFA) 108 (90 BASE) MCG/ACT inhaler Inhale 2 puffs into the lungs every 6 hours as needed for shortness of breath / dyspnea or wheezing 1 Inhaler 0     No Known Allergies      Reviewed and updated as needed this visit by Provider  Tobacco  Allergies  Meds  Problems  Med Hx  Surg Hx  Fam Hx         Review of Systems   ROS COMP: Constitutional, HEENT, cardiovascular, pulmonary, GI, , musculoskeletal, neuro, skin, endocrine and psych systems are negative, except as otherwise noted.      Objective    /50 (BP Location: Right arm, Patient Position: Chair, Cuff Size: Adult Large)   Pulse 98   Temp 98.9  F (37.2  C) (Tympanic)   Resp 17   Ht 1.778 m (5' 10\")   Wt 99.1 kg (218 lb 6.4 oz)   SpO2 97%   BMI 31.34 kg/m    Body mass index is 31.34 kg/m .  Physical Exam   GENERAL: healthy, alert and no distress,nontoxic in appearance  EYES: Eyes grossly normal to inspection, PERRL and conjunctivae and sclerae normal  HENT: ear canals and TM's normal, nose and mouth without ulcers or lesions, increased post nasal drip  NECK: no adenopathy, supple with full ROM  RESP: lungs clear to auscultation - no rales, rhonchi or wheezes  CV: regular rate and rhythm, normal S1 S2, no S3 or S4, no murmur, click or rub, no peripheral edema   ABDOMEN: soft, nontender, no hepatosplenomegaly, no masses and bowel sounds normal  MS: no gross musculoskeletal defects noted, no edema  No rash    Diagnostic Test " "Results:  Labs reviewed in Epic  Results for orders placed or performed in visit on 08/22/19 (from the past 24 hour(s))   Strep, Rapid Screen   Result Value Ref Range    Specimen Description Throat     Rapid Strep A Screen       NEGATIVE: No Group A streptococcal antigen detected by immunoassay, await culture report.           Assessment & Plan   Problem List Items Addressed This Visit     None      Visit Diagnoses     Post-nasal drip    -  Primary    Sore throat        Relevant Orders    Strep, Rapid Screen (Completed)    Beta strep group A culture (Completed)    Viral syndrome                 Tobacco Cessation:   reports that he has been smoking cigarettes.  He has never used smokeless tobacco.  Tobacco Cessation Action Plan: Information offered: Patient not interested at this time      BMI:   Estimated body mass index is 31.34 kg/m  as calculated from the following:    Height as of this encounter: 1.778 m (5' 10\").    Weight as of this encounter: 99.1 kg (218 lb 6.4 oz).   Weight management plan: Patient was referred to their PCP to discuss a diet and exercise plan.        Patient Instructions   Increase rest and fluids. Tylenol and/or Ibuprofen for comfort. Cool mist vaporizer. If your symptoms worsen or do not resolve follow up with your primary care provider in 1 week and sooner if needed.     Strep culture is pending will result in 24-48 hours.  If it is positive and change in treatment plan will contact you.           Mucinex 600 mg 12 hour formula for ear, head and chest congestion.  It can also thin post nasal drip which can cause a cough and sore throat.    Indications for emergent return to emergency department discussed with patient, who verbalized good understanding and agreement.  Patient understands the limitations of today's evaluation.           Patient Education     Viral Syndrome (Adult)  A viral illness may cause a number of symptoms such as fever. Other symptoms depend on the part of the body " "that the virus affects. If it settles in your nose, throat, and lungs, it may cause cough, sore throat, congestion, runny nose, headache, earache and other ear symptoms, or shortness of breath. If it settles in your stomach and intestinal tract, it may cause nausea, vomiting, cramping, and diarrhea. Sometimes it causes generalized symptoms like \"aching all over,\" feeling tired, loss of energy, or loss of appetite.  A viral illness usually lasts anywhere from several days to several weeks, but sometimes it lasts longer. In some cases, a more serious infection can look like a viral syndrome in the first few days of the illness. You may need another exam and additional tests to know the difference. Watch for the warning signs listed below for when to seek medical advice.  Home care  Follow these guidelines for taking care of yourself at home:    If symptoms are severe, rest at home for the first 2 to 3 days.    Stay away from cigarette smoke - both your smoke and the smoke from others.    You may use over-the-counter acetaminophen or ibuprofen for fever, muscle aching, and headache, unless another medicine was prescribed for this. If you have chronic liver or kidney disease or ever had a stomach ulcer or gastrointestinal bleeding, talk with your healthcare provider before using these medicines. No one who is younger than 18 and ill with a fever should take aspirin. It may cause severe disease or death.    Your appetite may be poor, so a light diet is fine. Avoid dehydration by drinking 8 to 12, 8-ounce glasses of fluids each day. This may include water; orange juice; lemonade; apple, grape, and cranberry juice; clear fruit drinks; electrolyte replacement and sports drinks; and decaffeinated teas and coffee. If you have been diagnosed with a kidney disease, ask your healthcare provider how much and what types of fluids you should drink to prevent dehydration. If you have kidney disease, drinking too much fluid can cause " it build up in the your body and be dangerous to your health.    Over-the-counter remedies won't shorten the length of the illness but may be helpful for symptoms such as cough, sore throat, nasal and sinus congestion, or diarrhea. Don't use decongestants if you have high blood pressure.  Follow-up care  Follow up with your healthcare provider if you do not improve over the next week.  Call 911  Call 911 if any of the following occur:    Convulsion    Feeling weak, dizzy, or like you are going to faint    Chest pain, or more than mild shortness of breath  When to seek medical advice  Call your healthcare provider right away if any of these occur:    Cough with lots of colored sputum (mucus) or blood in your sputum    Chest pain, shortness of breath, wheezing, or trouble breathing    Severe headache; face, neck, or ear pain    Severe, constant pain in the lower right side of your belly (abdominal)    Continued vomiting (can t keep liquids down)    Frequent diarrhea (more than 5 times a day); blood (red or black color) or mucus in diarrhea    Feeling weak, dizzy, or like you are going to faint    Extreme thirst    Fever of 100.4 F (38 C) or higher, or as directed by your healthcare provider  Date Last Reviewed: 4/1/2018 2000-2018 [employer]. [address] [place] [address], [place] [address]. All rights reserved. This information is not intended as a substitute for professional medical care. Always follow your healthcare professional's instructions.             Return in about 1 week (around 8/29/2019), or if symptoms worsen or fail to improve, for Follow up with your primary care provider.    TATIANA Khanna [facility]

## 2019-08-22 NOTE — LETTER
August 26, 2019      Anthony Edwards  [address]    [place] [address]        Dear Anthony,        This letter is to inform you that the results of your recent throat culture are negative.  If you have any questions please call or make an appointment.            Sincerely,        TATIANA Khanna CNP/ roselia

## 2019-08-23 LAB
BACTERIA SPEC CULT: NORMAL
SPECIMEN SOURCE: NORMAL

## 2019-08-30 ENCOUNTER — OFFICE VISIT (OUTPATIENT)
Dept: FAMILY MEDICINE | Facility: CLINIC | Age: 24
End: 2019-08-30
Payer: COMMERCIAL

## 2019-08-30 VITALS
HEART RATE: 85 BPM | WEIGHT: 213 LBS | SYSTOLIC BLOOD PRESSURE: 108 MMHG | RESPIRATION RATE: 20 BRPM | BODY MASS INDEX: 30.49 KG/M2 | DIASTOLIC BLOOD PRESSURE: 68 MMHG | TEMPERATURE: 98.7 F | OXYGEN SATURATION: 93 % | HEIGHT: 70 IN

## 2019-08-30 DIAGNOSIS — J20.9 BRONCHITIS WITH BRONCHOSPASM: Primary | ICD-10-CM

## 2019-08-30 DIAGNOSIS — H66.002 ACUTE SUPPURATIVE OTITIS MEDIA OF LEFT EAR WITHOUT SPONTANEOUS RUPTURE OF TYMPANIC MEMBRANE, RECURRENCE NOT SPECIFIED: ICD-10-CM

## 2019-08-30 PROCEDURE — 99214 OFFICE O/P EST MOD 30 MIN: CPT | Performed by: FAMILY MEDICINE

## 2019-08-30 RX ORDER — ALBUTEROL SULFATE 90 UG/1
2 AEROSOL, METERED RESPIRATORY (INHALATION) EVERY 4 HOURS PRN
Qty: 1 INHALER | Refills: 3 | Status: SHIPPED | OUTPATIENT
Start: 2019-08-30

## 2019-08-30 RX ORDER — AZITHROMYCIN 250 MG/1
TABLET, FILM COATED ORAL
Qty: 6 TABLET | Refills: 0 | Status: SHIPPED | OUTPATIENT
Start: 2019-08-30 | End: 2019-09-12

## 2019-08-30 ASSESSMENT — MIFFLIN-ST. JEOR: SCORE: 1972.41

## 2019-08-30 NOTE — PROGRESS NOTES
Subjective     Anthony Edwards is a 24 year old male who presents to clinic today for the following health issues:    HPI   ENT Symptoms             Symptoms: cc Present Absent Comment   Fever/Chills  x  99.9 last night for a fever, chills for the past week.   Fatigue  x     Muscle Aches  x     Eye Irritation  x  Pressure feeling around the eyes.   Sneezing  x  Some-more allergy related he feels.   Nasal Shabbir/Drg  x     Sinus Pressure/Pain  x     Loss of smell  x     Dental pain  x  At times.   Sore Throat   x Did have this a week ago.   Swollen Glands  x     Ear Pain/Fullness x x  Left ear is plugged, having some numbness for by the left ear.  Right ear is starting to feel plugged.  He and his dad did try to irrigate the left ear to see if it was wax.   Cough x x  Coughing out phlegm-green in color.  He did smoke E-cigarettes.  His mom is concerned about that.   Wheeze  x     Chest Pain   x    Shortness of breath  x     Rash   x    Other         Symptom duration:  Was seen at the [facility] on 8-22-19, symptoms started 4 days prior to that.   Symptom severity:  Getting worse.   Treatments tried:  Sudafed, Mucinex and Dayquil.   Contacts:  Ex-Girlfriend's child was ill.         Current Outpatient Medications:      cyclobenzaprine (FLEXERIL) 10 MG tablet, Take 1 tablet (10 mg) by mouth 3 times daily as needed for muscle spasms, Disp: 30 tablet, Rfl: 0     fluticasone (FLONASE) 50 MCG/ACT nasal spray, USE ONE TO TWO SPRAY(S) IN EACH NOSTRIL ONCE DAILY, Disp: 9.9 mL, Rfl: 1     ibuprofen (ADVIL,MOTRIN) 200 MG tablet, Take 400 mg by mouth, Disp: , Rfl:      loratadine (CLARITIN) 10 MG tablet, Take 10 mg by mouth, Disp: , Rfl:      meclizine (ANTIVERT) 25 MG tablet, Take 1 tablet (25 mg) by mouth 3 times daily as needed for dizziness, Disp: 30 tablet, Rfl: 1     montelukast (SINGULAIR) 10 MG tablet, Take 10 mg by mouth, Disp: , Rfl:      naproxen (NAPROSYN) 500 MG tablet, Take 1 tablet (500 mg) by mouth 2  "times daily (with meals), Disp: 60 tablet, Rfl: 1     olopatadine (PATANOL) 0.1 % ophthalmic solution, Place 1 drop into both eyes 2 times daily, Disp: 5 mL, Rfl: 1     sertraline (ZOLOFT) 50 MG tablet, Take 1 tablet (50 mg) by mouth daily, Disp: 30 tablet, Rfl: 2     SUMAtriptan (IMITREX) 50 MG tablet, Take 1 tablet (50 mg) by mouth at onset of headache for migraine May repeat in 2 hours. Max 4 tablets/24 hours., Disp: 6 tablet, Rfl: 11    Patient Active Problem List   Diagnosis     Atopic rhinitis     Attention deficit hyperactivity disorder (ADHD), combined type     Major depressive disorder, single episode, mild (H)     Stimulant dependence (H)     Allergic rhinitis     ADHD (attention deficit hyperactivity disorder)       Blood pressure 108/68, pulse 85, temperature 98.7  F (37.1  C), temperature source Tympanic, resp. rate 20, height 1.778 m (5' 10\"), weight 96.6 kg (213 lb), SpO2 93 %.    Exam:  GENERAL APPEARANCE: healthy, alert and no distress  EYES: EOMI,  PERRL  HENT: TM congested/bulging left and maxillary sinus tenderness bilateral  NECK: bilateral anterior cervical adenopathy  RESP: rhonchi bilateral and expiratory wheezes bilateral  CV: regular rates and rhythm, normal S1 S2, no S3 or S4 and no murmur, click or rub -  PSYCH: mentation appears normal and affect normal/bright    PFM is 360/570.       (J20.9) Bronchitis with bronchospasm  (primary encounter diagnosis)  Comment:   Plan: azithromycin (ZITHROMAX) 250 MG tablet,         albuterol (PROAIR HFA/PROVENTIL HFA/VENTOLIN         HFA) 108 (90 Base) MCG/ACT inhaler        Start the Zithromax and take for 5 days. Take with non spicy food. Stay well hydrated and consider the DM cough meds.   Use the albuterol inhaler as discussed, 2 puffs, as needed, every 3-4 hours. Identify triggers to avoid.     (H66.002) Acute suppurative otitis media of left ear without spontaneous rupture of tympanic membrane, recurrence not specified  Comment:   Plan: " azithromycin (ZITHROMAX) 250 MG tablet        See above. The ear pain should resolve. Follow up as needed.       \Guillermo Jackson MD

## 2019-08-30 NOTE — PATIENT INSTRUCTIONS
Thank you for choosing [facility].  You may be receiving an email and/or telephone survey request from [facility] [employer] regarding your visit today.  Please take a few minutes to respond to the survey to let us know how we are doing.      If you have questions or concerns, please contact us via [employer] or you can contact your care team at [phone].    Our Clinic hours are:  Monday 6:40 am  to 7:00 pm  Tuesday -Friday 6:40 am to 5:00 pm    The [place] outpatient lab hours are:  Monday - Friday 6:10 am to 4:45 pm  Saturdays 7:00 am to 11:00 am  Appointments are required, call [phone]    If you have clinical questions after hours or would like to schedule an appointment,  call the clinic at [phone].    (J20.9) Bronchitis with bronchospasm  (primary encounter diagnosis)  Comment:   Plan: azithromycin (ZITHROMAX) 250 MG tablet,         albuterol (PROAIR HFA/PROVENTIL HFA/VENTOLIN         HFA) 108 (90 Base) MCG/ACT inhaler        Start the Zithromax and take for 5 days. Take with non spicy food. Stay well hydrated and consider the DM cough meds.   Use the albuterol inhaler as discussed, 2 puffs, as needed, every 3-4 hours. Identify triggers to avoid.     (H66.002) Acute suppurative otitis media of left ear without spontaneous rupture of tympanic membrane, recurrence not specified  Comment:   Plan: azithromycin (ZITHROMAX) 250 MG tablet        See above. The ear pain should resolve. Follow up as needed.

## 2019-09-12 ENCOUNTER — OFFICE VISIT (OUTPATIENT)
Dept: FAMILY MEDICINE | Facility: CLINIC | Age: 24
End: 2019-09-12
Payer: COMMERCIAL

## 2019-09-12 ENCOUNTER — ANCILLARY PROCEDURE (OUTPATIENT)
Dept: GENERAL RADIOLOGY | Facility: CLINIC | Age: 24
End: 2019-09-12
Attending: NURSE PRACTITIONER
Payer: COMMERCIAL

## 2019-09-12 VITALS
BODY MASS INDEX: 30.92 KG/M2 | DIASTOLIC BLOOD PRESSURE: 60 MMHG | HEIGHT: 70 IN | OXYGEN SATURATION: 97 % | RESPIRATION RATE: 20 BRPM | SYSTOLIC BLOOD PRESSURE: 116 MMHG | HEART RATE: 71 BPM | TEMPERATURE: 97.9 F | WEIGHT: 216 LBS

## 2019-09-12 DIAGNOSIS — J01.90 ACUTE SINUSITIS WITH SYMPTOMS > 10 DAYS: ICD-10-CM

## 2019-09-12 DIAGNOSIS — Z23 NEED FOR PROPHYLACTIC VACCINATION AND INOCULATION AGAINST INFLUENZA: ICD-10-CM

## 2019-09-12 DIAGNOSIS — J20.9 ACUTE BRONCHITIS WITH SYMPTOMS > 10 DAYS: Primary | ICD-10-CM

## 2019-09-12 DIAGNOSIS — R05.9 COUGH: ICD-10-CM

## 2019-09-12 PROCEDURE — 99214 OFFICE O/P EST MOD 30 MIN: CPT | Mod: 25 | Performed by: NURSE PRACTITIONER

## 2019-09-12 PROCEDURE — 71046 X-RAY EXAM CHEST 2 VIEWS: CPT

## 2019-09-12 PROCEDURE — 90686 IIV4 VACC NO PRSV 0.5 ML IM: CPT | Performed by: NURSE PRACTITIONER

## 2019-09-12 PROCEDURE — 90471 IMMUNIZATION ADMIN: CPT | Performed by: NURSE PRACTITIONER

## 2019-09-12 RX ORDER — FLUTICASONE PROPIONATE 50 MCG
1-2 SPRAY, SUSPENSION (ML) NASAL DAILY
Qty: 16 G | Refills: 0 | Status: SHIPPED | OUTPATIENT
Start: 2019-09-12

## 2019-09-12 RX ORDER — DOXYCYCLINE HYCLATE 100 MG
100 TABLET ORAL 2 TIMES DAILY
Qty: 20 TABLET | Refills: 0 | Status: SHIPPED | OUTPATIENT
Start: 2019-09-12 | End: 2019-09-22

## 2019-09-12 RX ORDER — PREDNISONE 20 MG/1
TABLET ORAL
Qty: 10 TABLET | Refills: 0 | Status: SHIPPED | OUTPATIENT
Start: 2019-09-12

## 2019-09-12 ASSESSMENT — MIFFLIN-ST. JEOR: SCORE: 1986.02

## 2019-09-12 NOTE — PROGRESS NOTES
Subjective     Anthony Edwards is a 24 year old male who presents to clinic today for the following health issues:    HPI   Plugged Ear       Duration: Couple weeks     Description (location/character/radiation): Patient states that he had come in and been told that the issue was due to allergies. Came back in about a week ago and was treated for bronchitis and an ear infection. Ears are not any better. Patient also states that he does not feel that the bronchitis is better and that he used to use/smoke with a vape     Intensity:  moderate    Accompanying signs and symptoms: none    History (similar episodes/previous evaluation): None    Precipitating or alleviating factors: None    Therapies tried and outcome: None       Patient Active Problem List   Diagnosis     Atopic rhinitis     Attention deficit hyperactivity disorder (ADHD), combined type     Major depressive disorder, single episode, mild (H)     Stimulant dependence (H)     Allergic rhinitis     ADHD (attention deficit hyperactivity disorder)     History reviewed. No pertinent surgical history.    Social History     Tobacco Use     Smoking status: Current Some Day Smoker     Types: Cigarettes     Smokeless tobacco: Never Used   Substance Use Topics     Alcohol use: Yes     Alcohol/week: 0.0 oz     Comment: social     Family History   Problem Relation Age of Onset     Heart Failure Paternal Grandfather          Current Outpatient Medications   Medication Sig Dispense Refill     albuterol (PROAIR HFA/PROVENTIL HFA/VENTOLIN HFA) 108 (90 Base) MCG/ACT inhaler Inhale 2 puffs into the lungs every 4 hours as needed 1 Inhaler 3     cyclobenzaprine (FLEXERIL) 10 MG tablet Take 1 tablet (10 mg) by mouth 3 times daily as needed for muscle spasms 30 tablet 0     fluticasone (FLONASE) 50 MCG/ACT nasal spray USE ONE TO TWO SPRAY(S) IN EACH NOSTRIL ONCE DAILY 9.9 mL 1     ibuprofen (ADVIL,MOTRIN) 200 MG tablet Take 400 mg by mouth       loratadine (CLARITIN) 10 MG  "tablet Take 10 mg by mouth       meclizine (ANTIVERT) 25 MG tablet Take 1 tablet (25 mg) by mouth 3 times daily as needed for dizziness 30 tablet 1     montelukast (SINGULAIR) 10 MG tablet Take 10 mg by mouth       naproxen (NAPROSYN) 500 MG tablet Take 1 tablet (500 mg) by mouth 2 times daily (with meals) 60 tablet 1     olopatadine (PATANOL) 0.1 % ophthalmic solution Place 1 drop into both eyes 2 times daily 5 mL 1     sertraline (ZOLOFT) 50 MG tablet Take 1 tablet (50 mg) by mouth daily 30 tablet 2     SUMAtriptan (IMITREX) 50 MG tablet Take 1 tablet (50 mg) by mouth at onset of headache for migraine May repeat in 2 hours. Max 4 tablets/24 hours. 6 tablet 11     No Known Allergies  Recent Labs   Lab Test 04/08/19  1335 04/03/19  1105 04/30/17  0535   ALT  --  44 27   CR  --  0.80 0.87   GFRESTIMATED  --  >90 >90  Non African American GFR Calc     GFRESTBLACK  --  >90 >90  African American GFR Calc     POTASSIUM  --  3.8 3.6   TSH 1.33  --   --       BP Readings from Last 3 Encounters:   09/12/19 116/60   08/30/19 108/68   08/22/19 112/50    Wt Readings from Last 3 Encounters:   09/12/19 98 kg (216 lb)   08/30/19 96.6 kg (213 lb)   08/22/19 99.1 kg (218 lb 6.4 oz)                    Reviewed and updated as needed this visit by Provider         Review of Systems   ROS COMP: Constitutional, HEENT, cardiovascular, pulmonary, gi and gu systems are negative, except as otherwise noted.      Objective    /60 (BP Location: Right arm, Patient Position: Sitting, Cuff Size: Adult Large)   Pulse 71   Temp 97.9  F (36.6  C) (Tympanic)   Resp 20   Ht 1.778 m (5' 10\")   Wt 98 kg (216 lb)   SpO2 97%   BMI 30.99 kg/m    Body mass index is 30.99 kg/m .  Physical Exam   GENERAL: healthy, alert and no distress  EYES: Eyes grossly normal to inspection, PERRL and conjunctivae and sclerae normal  HENT: ear canals and TM's normal, nose and mouth without ulcers or lesions Maxillary sinus tenderness, bilateral turbinates " inflamed and edematous    NECK: no adenopathy, no asymmetry, masses, or scars and thyroid normal to palpation  RESP: lungs clear to auscultation - no rales, rhonchi or wheezes  BREAST: normal without masses, tenderness or nipple discharge and no palpable axillary masses or adenopathy  CV: regular rate and rhythm, normal S1 S2, no S3 or S4, no murmur, click or rub, no peripheral edema and peripheral pulses strong  ABDOMEN: soft, nontender, no hepatosplenomegaly, no masses and bowel sounds normal  MS: no gross musculoskeletal defects noted, no edema  SKIN: no suspicious lesions or rashes  NEURO: Normal strength and tone, mentation intact and speech normal  PSYCH: mentation appears normal, affect normal/bright  CHEST TWO VIEWS  9/12/2019 1:40 PM      HISTORY: 24-year-old patient with history of cough.                                                                       IMPRESSION: Since April 30, 2017, heart size is normal. No pleural  effusion, pneumothorax, or abnormal area of consolidation.     MERE MOELLER MD          Assessment & Plan     (J20.9) Acute bronchitis with symptoms > 10 days  (primary encounter diagnosis)  Comment: Patient's x-ray negative for infection patient will work on decreasing smoking we will treat for bronchitis with a course of prednisone and continued use of his inhaler if not improving after treatment patient should return and we will further work-up for any underlying lung disorders  Plan: predniSONE (DELTASONE) 20 MG tablet      (J01.90) Acute sinusitis with symptoms > 10 days  Comment:   Plan: fluticasone (FLONASE) 50 MCG/ACT nasal spray,         doxycycline hyclate (VIBRA-TABS) 100 MG tablet    (R05) Cough  Comment: *  Plan: XR Chest 2 Views      (Z23) Need for prophylactic vaccination and inoculation against influenza  Comment:   Plan: INFLUENZA VACCINE IM > 6 MONTHS VALENT IIV4         [99929], Vaccine Administration, Initial         [32057]         Tobacco Cessation:   reports  "that he has been smoking cigarettes.  He has never used smokeless tobacco.  Tobacco Cessation Action Plan: Information offered: Patient not interested at this time      BMI:   Estimated body mass index is 30.99 kg/m  as calculated from the following:    Height as of this encounter: 1.778 m (5' 10\").    Weight as of this encounter: 98 kg (216 lb).   Weight management plan: Discussed healthy diet and exercise guidelines        See Patient Instructions    Return in about 1 week (around 9/19/2019), or if symptoms worsen or fail to improve.    TATIANA Ball [facility]      "

## 2019-09-12 NOTE — PATIENT INSTRUCTIONS
Use Medication as directed    If hearing not improved over the next 2 weeks contact me will consider doing formal hearing test  Patient advised to call for any test results (if obtained during visit) within 2-3 days.     Hydrate with fluids, rest, cool humidifier.  May use acetaminophen, ibuprofen prn.    For your Cough   The Buckwheat Honey Dose: Given   hour Prior to Bedtime  For children age under 1 year -Do not use due to botulism risk     2-5 years -  tsp (2.5 mL)    6-11 years -1 tsp (5 mL)    12-18 years -2 tsp (10 mL)     Guaifenesin     Adult dose -Not to exceed 2.4 g (2400mg) per day    Child age 6-12 years -100 mg every 4 hr, not to exceed 1.2 g (1200mg) per day     Pediatric, 2-6 years -50 mg every 4 hr as needed, not to exceed 600 mg per day    Cough medications is not recommended in children under 2 years.  With use of cough medications have combination medications be aware of products in the cough medication you are using to avoid overdose      Go to Emergency Room if sx worsen or change, Shortness of breath, chest pain, persistent fevers, or painful breathing occur.            Patient Education     Bronchitis, Antibiotic Treatment (Adult)    Bronchitis is an infection of the air passages (bronchial tubes) in your lungs. It often occurs when you have a cold. This illness is contagious during the first few days and is spread through the air by coughing and sneezing, or by direct contact (touching the sick person and then touching your own eyes, nose, or mouth).  Symptoms of bronchitis include cough with mucus (phlegm) and low-grade fever. Bronchitis usually lasts 7 to 14 days. Mild cases can be treated with simple home remedies. More severe infection is treated with an antibiotic.  Home care  Follow these guidelines when caring for yourself at home:    If your symptoms are severe, rest at home for the first 2 to 3 days. When you go back to your usual activities, don't let yourself get too  tired.    Don't smoke. Also stay away from secondhand smoke.    You may use over-the-counter medicines to control fever or pain, unless another medicine was prescribed. If you have chronic liver or kidney disease or have ever had a stomach ulcer or gastrointestinal bleeding, talk with your healthcare provider before using these medicines. Also talk to your provider if you are taking medicine to prevent blood clots. Aspirin should never be given to anyone younger than 18 who is ill with a viral infection or fever. It may cause severe liver or brain damage.    Your appetite may be low, so a light diet is fine. Stay well hydrated by drinking 6 to 8 glasses of fluids per day. This includes water, soft drinks, sports drinks, juices, tea, or soup. Extra fluids will help loosen mucus in your nose and lungs.    Over-the-counter cough, cold, and sore-throat medicines will not shorten the length of the illness, but they may be helpful to reduce your symptoms. Don't use decongestants if you have high blood pressure.    Finish all antibiotic medicine. Do this even if you are feeling better after only a few days.  Follow-up care  Follow up with your healthcare provider, or as advised. If you had an X-ray or ECG (electrocardiogram), a specialist will review it. You will be told of any new test results that may affect your care.  If you are age 65 or older, if you smoke, or if you have a chronic lung disease or condition that affects your immune system, ask your healthcare provider about getting a pneumococcal vaccine and a yearly flu shot (influenza vaccine).  When to seek medical advice  Call your healthcare provider right away if any of these occur:    Fever of 100.4 F (38 C) or higher, or as directed by your healthcare provider    Coughing up more sputum    Weakness, drowsiness, headache, facial pain, ear pain, or a stiff neck  Call 911  Call 911 if any of these occur.    Coughing up blood    Weakness, drowsiness, headache, or  stiff neck that get worse    Trouble breathing, wheezing, or pain with breathing  Date Last Reviewed: 6/1/2018 2000-2018 [employer]. [address] [place] [address], [place] [address]. All rights reserved. This information is not intended as a substitute for professional medical care. Always follow your healthcare professional's instructions.           Patient Education     Sinusitis (Antibiotic Treatment)    The sinuses are air-filled spaces within the bones of the face. They connect to the inside of the nose. Sinusitis is an inflammation of the tissue that lines the sinuses. Sinusitis can occur during a cold. It can also happen due to allergies to pollens and other particles in the air. Sinusitis can cause symptoms of sinus congestion and a feeling of fullness. A sinus infection causes fever, headache, and facial pain. There is often green or yellow fluid draining from the nose or into the back of the throat (post-nasal drip). You have been given antibiotics to treat this condition.  Home care    Take the full course of antibiotics as instructed. Do not stop taking them, even when you feel better.    Drink plenty of water, hot tea, and other liquids. This may help thin nasal mucus. It also may help your sinuses drain fluids.    Heat may help soothe painful areas of your face. Use a towel soaked in hot water. Or,  the shower and direct the warm spray onto your face. Using a vaporizer along with a menthol rub at night may also help soothe symptoms.     An expectorant with guaifenesin may help thin nasal mucus and help your sinuses drain fluids.    You can use an over-the-counter decongestant, unless a similar medicine was prescribed to you. Nasal sprays work the fastest. Use one that contains phenylephrine or oxymetazoline. First blow your nose gently. Then use the spray. Do not use these medicines more often than directed on the label. If you do, your symptoms may get worse. You may also take pills  that contain pseudoephedrine. Don t use products that combine multiple medicines. This is because side effects may be increased. Read labels. You can also ask the pharmacist for help. (People with high blood pressure should not use decongestants. They can raise blood pressure.)    Over-the-counter antihistamines may help if allergies contributed to your sinusitis.      Do not use nasal rinses or irrigation during an acute sinus infection, unless your healthcare provider tells you to. Rinsing may spread the infection to other areas in your sinuses.    Use acetaminophen or ibuprofen to control pain, unless another pain medicine was prescribed to you. If you have chronic liver or kidney disease or ever had a stomach ulcer, talk with your healthcare provider before using these medicines. (Aspirin should never be taken by anyone under age 18 who is ill with a fever. It may cause severe liver damage.)    Don't smoke. This can make symptoms worse.  Follow-up care  Follow up with your healthcare provider or our staff if you are not better in 1 week.  When to seek medical advice  Call your healthcare provider if any of these occur:    Facial pain or headache that gets worse    Stiff neck    Unusual drowsiness or confusion    Swelling of your forehead or eyelids    Vision problems, such as blurred or double vision    Fever of 100.4 F (38 C) or higher, or as directed by your healthcare provider    Seizure    Breathing problems    Symptoms don't go away in 10 days  Prevention  Here are steps you can take to help prevent an infection:    Keep good hand washing habits.    Don t have close contact with people who have sore throats, colds, or other upper respiratory infections.    Don t smoke, and stay away from secondhand smoke.    Stay up to date with of your vaccines.  Date Last Reviewed: 11/1/2017 2000-2018 The "[employer]. [address] [place] [address], [place] [address]. All rights reserved. This information is not  intended as a substitute for professional medical care. Always follow your healthcare professional's instructions.

## 2020-08-30 ENCOUNTER — HOSPITAL ENCOUNTER (EMERGENCY)
Facility: CLINIC | Age: 25
Discharge: HOME | End: 2020-08-30
Attending: PHYSICIAN ASSISTANT | Admitting: PHYSICIAN ASSISTANT
Payer: COMMERCIAL

## 2020-08-30 VITALS
HEART RATE: 89 BPM | HEIGHT: 71 IN | BODY MASS INDEX: 30.8 KG/M2 | RESPIRATION RATE: 18 BRPM | WEIGHT: 220 LBS | OXYGEN SATURATION: 99 % | TEMPERATURE: 98 F | SYSTOLIC BLOOD PRESSURE: 163 MMHG | DIASTOLIC BLOOD PRESSURE: 78 MMHG

## 2020-08-30 DIAGNOSIS — M54.2 CERVICALGIA: ICD-10-CM

## 2020-08-30 DIAGNOSIS — M77.8 TENDONITIS OF ELBOW, LEFT: ICD-10-CM

## 2020-08-30 PROCEDURE — 99282 EMERGENCY DEPT VISIT SF MDM: CPT | Mod: Z6 | Performed by: PHYSICIAN ASSISTANT

## 2020-08-30 PROCEDURE — 99282 EMERGENCY DEPT VISIT SF MDM: CPT | Performed by: PHYSICIAN ASSISTANT

## 2020-08-30 ASSESSMENT — MIFFLIN-ST. JEOR: SCORE: 2015.04

## 2020-08-30 NOTE — ED AVS SNAPSHOT
[facility] Emergency Department  [address] [facility] [address]  Phone:  [phone]  Fax:  [phone]                                    Anthony Edwards   MRN: [id number]    Department:  [facility] Emergency Department   Date of Visit:  8/30/2020           After Visit Summary Signature Page    I have received my discharge instructions, and my questions have been answered. I have discussed any challenges I see with this plan with the nurse or doctor.    ..........................................................................................................................................  Patient/Patient Representative Signature      ..........................................................................................................................................  Patient Representative Print Name and Relationship to Patient    ..................................................               ................................................  Date                                   Time    ..........................................................................................................................................  Reviewed by Signature/Title    ...................................................              ..............................................  Date                                               Time          22EPIC Rev 08/18

## 2020-08-30 NOTE — ED NOTES
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 Patient is right handed. Patient states,\"since I'm here, I would like to have my neck evaluated\" - pt states he has been sore since. Patient does not want to bill as workers comp. Does not attribute to any specific injury  "

## 2020-08-30 NOTE — ED PROVIDER NOTES
History     Chief Complaint   Patient presents with     Arm Pain     pain to left elbow for over 1 month     Neck Pain     300 lb window fell on head 3 weeks ago, continues with neck pin     HPI  Anthony Edwards is a 25 year old right hand dominant  male who presents to the emergency department with concern over left elbow pain still present for approximately the last 3 months.  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  Pain is exacerbated by lifting, certain movements and alleviated by rest.  He will have occasional intermittent shooting pains down his right forearm however nothing consistently.  He has not had any ecchymosis, lacerations, abrasions however did noted some redness in the antecubital fossa concerning for rash or broken blood vessels earlier today.  He denies any numbness or paresthesias.   He does report repetitive movement through his employment.  He also states that pain was more severe after he helped a friend move this weekend.      He has a second chief complaint over intermittent neck pain.  Approximately 3 weeks ago patient had a large window that fell  onto his head with secondary impact on his upper back.  He did have swelling, ecchymosis, headache, day sensation, dizziness lightheadedness initially which resolved after several days.  24-48 hours after initial injury he noted increasing neck discomfort described as stiffness, rightness with occasional  radiation down into his back.  He reports a history of chronic neck pain. No significant imaging recently.  He has not attempted any treatment specifically for his neck or arm pain however will occasionally take Excedrin for his headaches, last use was several days ago.      Allergies:  No Known Allergies    Problem List:    Patient Active Problem List    Diagnosis Date Noted     Attention deficit hyperactivity disorder (ADHD), combined  type 12/19/2016     Priority: Medium     Major depressive disorder, single episode, mild (H) 12/19/2016     Priority: Medium     Stimulant dependence (H) 12/19/2016     Priority: Medium     Atopic rhinitis 05/20/2013     Priority: Medium     Allergic rhinitis 05/20/2013     Priority: Medium     ADHD (attention deficit hyperactivity disorder) 11/15/2011     Priority: Medium        Past Medical History:    No past medical history on file.    Past Surgical History:    No past surgical history on file.    Family History:    Family History   Problem Relation Age of Onset     Heart Failure Paternal Grandfather      Social History:  Marital Status:  Single [1]  Social History     Tobacco Use     Smoking status: Current Some Day Smoker     Types: Cigarettes     Smokeless tobacco: Never Used   Substance Use Topics     Alcohol use: Yes     Alcohol/week: 0.0 standard drinks     Comment: social     Drug use: Yes     Types: Marijuana     Comment: Off and on to help with his migraines.      Medications:    albuterol (PROAIR HFA/PROVENTIL HFA/VENTOLIN HFA) 108 (90 Base) MCG/ACT inhaler  cyclobenzaprine (FLEXERIL) 10 MG tablet  fluticasone (FLONASE) 50 MCG/ACT nasal spray  fluticasone (FLONASE) 50 MCG/ACT nasal spray  ibuprofen (ADVIL,MOTRIN) 200 MG tablet  loratadine (CLARITIN) 10 MG tablet  meclizine (ANTIVERT) 25 MG tablet  montelukast (SINGULAIR) 10 MG tablet  naproxen (NAPROSYN) 500 MG tablet  olopatadine (PATANOL) 0.1 % ophthalmic solution  predniSONE (DELTASONE) 20 MG tablet  sertraline (ZOLOFT) 50 MG tablet  SUMAtriptan (IMITREX) 50 MG tablet      Review of Systems   Constitutional: Negative for chills and fever.   HENT: Negative for congestion, ear discharge and sore throat.    Eyes: Negative for photophobia, pain, discharge, redness, itching and visual disturbance.   Respiratory: Negative for cough, shortness of breath and wheezing.    Cardiovascular: Negative for chest pain.   Gastrointestinal: Negative for abdominal  "pain, diarrhea, nausea and vomiting.   Musculoskeletal: Positive for arthralgias (left elbow), back pain and neck pain.   Skin: Negative for color change, rash and wound.   Neurological: Positive for headaches (intermittent consistent with migraines, none currently). Negative for dizziness, syncope, weakness, light-headedness and numbness.     Physical Exam   BP: (!) 163/78  Pulse: 89  Temp: 98  F (36.7  C)  Resp: 18  Height: 180.3 cm (5' 11\")  Weight: 99.8 kg (220 lb)  SpO2: 99 %  Physical Exam  Constitutional:       General: He is not in acute distress.     Appearance: He is not ill-appearing or toxic-appearing.   HENT:      Head: Normocephalic and atraumatic.   Cardiovascular:      Rate and Rhythm: Normal rate and regular rhythm.      Pulses:           Radial pulses are 2+ on the right side and 2+ on the left side.      Heart sounds: No murmur. No friction rub. No gallop.    Pulmonary:      Effort: Pulmonary effort is normal. No respiratory distress.      Breath sounds: Normal breath sounds. No wheezing, rhonchi or rales.   Musculoskeletal:      Left shoulder: Normal.      Left elbow: He exhibits normal range of motion, no swelling, no effusion, no deformity and no laceration. Tenderness found.      Cervical back: He exhibits decreased range of motion (Actively with flexion and rotation secondary to discomfort), tenderness (trapezius muscle) and spasm. He exhibits no bony tenderness, no swelling, no edema, no deformity and no laceration.      Thoracic back: He exhibits normal range of motion, no tenderness, no bony tenderness, no swelling, no edema, no deformity, no laceration, no pain, no spasm and normal pulse.      Lumbar back: He exhibits tenderness and pain. He exhibits normal range of motion, no bony tenderness, no swelling, no edema, no deformity and no laceration.   Skin:     General: Skin is warm and dry.      Capillary Refill: Capillary refill takes less than 2 seconds.      Comments: Few small " petechiae noted in left antecubital fossa   Neurological:      Mental Status: He is alert.       ED Course        Procedures        Critical Care time:  none        No results found for this or any previous visit (from the past 24 hour(s)).  Medications - No data to display     Assessments & Plan (with Medical Decision Making)     I have reviewed the nursing notes.  I have reviewed the findings, diagnosis, plan and need for follow up with the patient.      Discharge Medication List as of 8/30/2020 11:41 AM        Final diagnoses:   Tendonitis of elbow, left   Cervicalgia     25-year-old male presents to the emergency department concern over several weeks of left elbow and neck pain.  He had elevated blood pressure upon arrival, remainder vital signs within normal limits.  Physical exam findings as described above.  Left elbow appears most consistent with tendinitis/overuse injury.  No significant swelling to suggest bursitis no erythema to suggest cellulitis, septic arthritis.  He denies any recent trauma to suggest fracture or indication for imaging.  Neck pain consistent with muscle strain, spasm.  Differential would also include DDD's, spinal stenosis, herniated disc, no worrisome symptoms to suggest infectious etiology such as epidural abscess, osteomyelitis.  I do not suspect fracture and will defer repeat imaging at this time.  Similarly I do not suspect intrathoracic etiology of his neck pain.  He was discharged home stable with instructions for symptomatic treatment with rest, ice/heat, Tylenol/ibuprofen.   Physical therapy referral given.  Follow up with sports medicine or ortho if no improvement in 1-2 weeks.  Worrisome reasons to return to ER sooner discussed.     Disclaimer: This note consists of symbols derived from keyboarding, dictation, and/or voice recognition software. As a result, there may be errors in the script that have gone undetected.  Please consider this when interpreting information found  in the chart.    8/30/2020   [facility] EMERGENCY DEPARTMENT     Roshni Luna PA-C  09/02/20 1500

## 2020-09-02 ASSESSMENT — ENCOUNTER SYMPTOMS
SORE THROAT: 0
DIARRHEA: 0
SHORTNESS OF BREATH: 0
NAUSEA: 0
WOUND: 0
WHEEZING: 0
COLOR CHANGE: 0
PHOTOPHOBIA: 0
BACK PAIN: 1
LIGHT-HEADEDNESS: 0
EYE DISCHARGE: 0
ARTHRALGIAS: 1
FEVER: 0
HEADACHES: 1
NUMBNESS: 0
COUGH: 0
NECK PAIN: 1
CHILLS: 0
DIZZINESS: 0
VOMITING: 0
EYE REDNESS: 0
ABDOMINAL PAIN: 0
WEAKNESS: 0
EYE PAIN: 0
EYE ITCHING: 0

## 2020-09-09 ENCOUNTER — OFFICE VISIT (OUTPATIENT)
Dept: FAMILY MEDICINE | Facility: CLINIC | Age: 25
End: 2020-09-09
Payer: COMMERCIAL

## 2020-09-09 ENCOUNTER — ANCILLARY PROCEDURE (OUTPATIENT)
Dept: GENERAL RADIOLOGY | Facility: CLINIC | Age: 25
End: 2020-09-09
Attending: PHYSICIAN ASSISTANT
Payer: COMMERCIAL

## 2020-09-09 VITALS
WEIGHT: 218.6 LBS | HEART RATE: 90 BPM | TEMPERATURE: 98.7 F | OXYGEN SATURATION: 96 % | DIASTOLIC BLOOD PRESSURE: 60 MMHG | SYSTOLIC BLOOD PRESSURE: 118 MMHG | RESPIRATION RATE: 18 BRPM | BODY MASS INDEX: 30.49 KG/M2

## 2020-09-09 DIAGNOSIS — H60.392 INFECTIVE OTITIS EXTERNA, LEFT: ICD-10-CM

## 2020-09-09 DIAGNOSIS — M79.602 PAIN OF LEFT UPPER EXTREMITY: ICD-10-CM

## 2020-09-09 DIAGNOSIS — M25.522 LEFT ELBOW PAIN: Primary | ICD-10-CM

## 2020-09-09 DIAGNOSIS — M25.522 LEFT ELBOW PAIN: ICD-10-CM

## 2020-09-09 PROCEDURE — 99214 OFFICE O/P EST MOD 30 MIN: CPT | Performed by: PHYSICIAN ASSISTANT

## 2020-09-09 PROCEDURE — 73070 X-RAY EXAM OF ELBOW: CPT | Mod: LT

## 2020-09-09 RX ORDER — OFLOXACIN 3 MG/ML
10 SOLUTION AURICULAR (OTIC) DAILY
Qty: 4 ML | Refills: 0 | Status: SHIPPED | OUTPATIENT
Start: 2020-09-09 | End: 2020-09-16

## 2020-09-09 NOTE — PROGRESS NOTES
Subjective     Anthony Edwarsd is a 25 year old male who presents to clinic today for the following health issues:    HPI   Acute Illness  Acute illness concerns: Ear pain   Onset/Duration:  5 days   Symptoms:  Fever: no  Chills/Sweats: no  Headache (location?): YES  Sinus Pressure: YES-some  Conjunctivitis:  no  Ear Pain: YES: left  Rhinorrhea: no  Congestion: YES  Sore Throat: no  Cough: no  Wheeze: YES  Decreased Appetite: no  Nausea: YES  Vomiting: no  Diarrhea: no  Dysuria/Freq.: no  Dysuria or Hematuria: no  Fatigue/Achiness: YES  Sick/Strep Exposure: no  Therapies tried and outcome: Ibuprofen     Musculoskeletal problem/pain  Onset/Duration: 3-4 months   Description  Location: elbow - left  Joint Swelling: no  Redness: no  Pain: YES  Warmth: YES  Intensity:  Moderate-severe  Progression of Symptoms:  worsening  Accompanying signs and symptoms:   Fevers: no  Numbness/tingling/weakness: YES- numbness   History  Trauma to the area: no  Recent illness:  no  Previous similar problem: YES  Previous evaluation:  YES- in the Emergency room   Precipitating or alleviating factors:  Aggravating factors include: lifting and extension of the elbow.   Therapies tried and outcome: Ibuprofen    Review of Systems   Constitutional, HEENT, cardiovascular, pulmonary, msk, neuro, skin systems are negative, except as otherwise noted.      Objective    /60 (BP Location: Right arm, Patient Position: Sitting, Cuff Size: Adult Large)   Pulse 90   Temp 98.7  F (37.1  C) (Tympanic)   Resp 18   Wt 99.2 kg (218 lb 9.6 oz)   SpO2 96%   BMI 30.49 kg/m    Body mass index is 30.49 kg/m .  Physical Exam   Constitutional: healthy, alert, and no distress  Head: Normocephalic. Atraumatic  Eyes: No conjunctival injection, sclera anicteric  ENT: Erythematous L ear canal. TM is wnl. R TM and canal are wnl. MMM. PO unremarkable.   Respiratory: No resp distress.  Musculoskeletal: extremities normal- no gross deformities noted, and  normal muscle tone. L elbow with generalized tenderness. FROM. Negative tennis elbow test.   Neurologic: Gait normal. CN 2-12 grossly intact  Psychiatric: mentation appears normal and affect normal/bright     Xray - Left elbow X-ray demonstrates no evidence of fracture or dislocation per my read.       Assessment & Plan   Left elbow pain  Pt with ongoing left elbow pain for the last few months. Worse with terminal extension of the elbow. Also with lifting heavy objects. Exam not consistent with tennis elbow today. There is generalized tenderness to palpation as well. Unclear etiology today. Will get XR and refer to PT. If not improved in 4 weeks RETURN TO CLINIC for reevaluation and possible MRI.   - PHYSICAL THERAPY REFERRAL; Future  - XR Elbow Left 2 Views; Future    Infective otitis externa, left  History and exam consistent with AOE on the L. No red flag signs or symptoms. Remainder of exam benign. Rx for ofloxacin ear drops. RTC prn for any new, changing or worsening symptoms.     - ofloxacin (FLOXIN) 0.3 % otic solution; Place 10 drops Into the left ear daily for 7 days     Return in about 4 weeks (around 10/7/2020), or if symptoms worsen or fail to improve, for In-Clinic Visit.    Mark Le PA-C  [facility]

## 2020-09-09 NOTE — PATIENT INSTRUCTIONS
Start the ear drops for your external ear infection.     I am not quite sure what is going on in your elbow, but lets try physical therapy and an xray, and some lighter duty at work to see if this works. If not, well move forward with an MRI.     Patient Education     RICE     Rest an injury, elevate it, and use ice and compression as directed.   RICE stands for rest, ice, compression, and elevation. These can limit pain and swelling after an injury. RICE may be recommended to help treat breaks (fractures), sprains, strains, and bruises or bumps.   Home care  Here are the details of RICE:    Rest. Limit the use of the injured body part. This helps prevent further damage to the body part and gives it time to heal. In some cases, you may need a sling, brace, splint, or cast to help keep the body part still until it has healed.    Ice. Applying ice right after an injury helps relieve pain and swelling. To make an ice pack, put ice cubes in a plastic bag that seals at the top. Wrap the bag in a clean, thin towel or cloth. Then place it over the injured area. Do this for 10 to 15 minutes every 3 to 4 hours. Continue for the next 1 to 3 days or until your symptoms improve. Never put ice directly on your skin. Don't ice an area longer than 15 minutes at a time.    Compression. Putting pressure on an injury helps reduce swelling and provides support. Wrap the injured area firmly with an elastic bandage or wrap. Make sure not to wrap the bandage too tightly or you will cut off blood flow to the injured area. If your bandage loosens, rewrap it.    Elevation. Keeping an injury raised or elevated above the level of your heart reduces swelling, pain, and throbbing. For instance, if you have a broken leg, it may help to rest your leg on several pillows when sitting or lying down. Try to keep the injured area elevated as often as possible.  Follow-up care  Follow up with your healthcare provider, or as advised.  When to seek  medical advice  Call your healthcare provider right away if any of these occur:    Fever of 100.4 F (38 C) or higher, or as directed by your healthcare provider    Chills    Increased pain or swelling in the injured body part    Injured body part becomes cold, blue, numb, or tingly    Signs of infection. These include warmth in the skin, redness, drainage, or bad smell coming from the injured body part.  Date Last Reviewed: 6/1/2018 2000-2019 [employer]. [address] [place] [address]. All rights reserved. This information is not intended as a substitute for professional medical care. Always follow your healthcare professional's instructions.

## 2020-09-09 NOTE — LETTER
September 9, 2020      Anthony Edwards  [address]  [facility] [address]        To Whom It May Concern:    Anthony Edwards was seen in our clinic. He may return to work with the following: limited to light duty - lifting no greater than 20 pounds with the left arm and no use of left arm over shoulders for two weeks. He can return to work without restrictions thereafter.       Sincerely,          Mark Le PA-C

## 2020-09-16 ENCOUNTER — HOSPITAL ENCOUNTER (OUTPATIENT)
Dept: PHYSICAL THERAPY | Facility: CLINIC | Age: 25
Setting detail: THERAPIES SERIES
End: 2020-09-16
Attending: PHYSICIAN ASSISTANT
Payer: COMMERCIAL

## 2020-09-16 PROCEDURE — 97110 THERAPEUTIC EXERCISES: CPT | Mod: GP | Performed by: PHYSICAL THERAPIST

## 2020-09-16 PROCEDURE — 97140 MANUAL THERAPY 1/> REGIONS: CPT | Mod: GP | Performed by: PHYSICAL THERAPIST

## 2020-09-16 PROCEDURE — 97161 PT EVAL LOW COMPLEX 20 MIN: CPT | Mod: GP | Performed by: PHYSICAL THERAPIST

## 2020-09-18 NOTE — PROGRESS NOTES
09/16/20 1600   General Information   Type of Visit Initial OP Ortho PT Evaluation   Start of Care Date 09/16/20   Referring Physician Roshni Luna, RITESH    Patient/Family Goals Statement to figure out what to do to make it feel better.    Orders Evaluate and Treat   Date of Order 08/30/20   Medical Diagnosis neck pain   Body Part(s)   Body Part(s) Cervical Spine   Presentation and Etiology   Pertinent history of current problem (include personal factors and/or comorbidities that impact the POC) Pt presents w/ neck pain that radiates down to lower midback X 2 months.  Pt states he had a windown 320# which fell hit pt's head and then the UB.  Pt had a migraine X 3-4 days.  Pain @ best 3/10,  At worst 9/10.  Pt notes pain radiates to L shoulder.   Pt notes intermittent numbness down L UE to index and middle finger (less frequent currently).  No tests.  Meds: ibuprofen prn.  Pt is R dominant.   PMHX:  chronic migraines,  Currently has L elbow pain ( 3-4 month hx)).   Chronic LBP.  Mod: job tasks   Impairments A. Pain;D. Decreased ROM;E. Decreased flexibility;K. Numbness;L. Tingling   Pain quality E. Shooting;C. Aching   Pain exacerbation comment turning head,  tipping head back,   Lifting 50# (brings on the numbness).  2-3x/wk difficulty getting to sleep.  Riding dirt bike aggravates symptoms   Pain/symptoms eased by C. Rest;F. 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  currently on light duty X 2 weeks for elbow.     Fall Risk Screen   Fall screen completed by PT   Have you fallen 2 or more times in the past year? No   Have you fallen and had an injury in the past year? No   Is patient a fall risk? No   Abuse Screen (yes response referral indicated)    Feels Unsafe at Home or Work/School no   Feels Threatened by Someone no   Does Anyone Try to Keep You From Having Contact with Others or Doing Things Outside Your Home? no   Cervical Spine   Posture mild FH and mild increased thoracic kyphosis   Cervical Flexion ROM WNL, notes ache posterior neck   Cervical Extension ROM WNL   Cervical Right Side Bending ROM 50% w/ L sided tightness   Cervical Left Side Bending ROM WNL a little soreness   Cervical Right Rotation ROM WNL, notes L sided irritation   Cervical Left Rotation ROM WNL , no complaints   Shoulder AROM Screen Flex R 155*, L 145*;  ABD R 150*, L 140*, ER B WFL notes pain interscap region, , IR WFL--no complaints   Shoulder Shrug (C2-C4) Strength 5/5   Shoulder Abd (C5) Strength 5/5   Elbow Flexion (C5, C6) Strength B 5/5   Elbow Extension (C7) Strength B 5/5   Pectoralis Minor Flexibility mild tightness    Spurling Test R neg,  L increased R sided symptoms.   Segmental Mobility-Cervical decreased sideglides  C3-C6 B    Segmental Mobility-Thoracic ERSL T1 and T2   Palpation Moderate tenderness B scalene/ UT and L rhomboids   UE Neural Tension UE Neural Tension Tests   ULTT II (Median and Musculocutaneous and Axillary) Positive  (tingling middle finger)   ULTT III (Radial) Negative   ULTT IV (Ulnar) Negative  (however noted soreness in shoulder)   Planned Therapy Interventions   Planned Therapy Interventions manual therapy;ROM;strengthening;stretching   Planned Modality Interventions   Planned Modality Interventions TENS   Clinical Impression   Criteria for Skilled Therapeutic Interventions Met yes, treatment indicated   PT Diagnosis neck pain   Influenced by the following impairments pain, decreased mobility, numbness   Functional limitations due to impairments turning head , tipping head back, heavy lifting, sleeping, riding dirt bike   Clinical Presentation Stable/Uncomplicated   Clinical Presentation Rationale no recent change in symptoms   Clinical  Decision Making (Complexity) Low complexity   Therapy Frequency 1 time/week   Predicted Duration of Therapy Intervention (days/wks) 6 weeks    Risk & Benefits of therapy have been explained Yes   Patient, Family & other staff in agreement with plan of care Yes   Education Assessment   Barriers to Learning No barriers   Ortho Goal 1   Goal Identifier 1.     Goal Description Pt will be able to turn his head w/ ADL's w/ pain no > 2/10   Target Date 10/18/20   Ortho Goal 2   Goal Identifier 2.   Goal Description Pt will be able to lift 50# w/ pain no > 4/10   Target Date 11/07/20   Ortho Goal 3   Goal Identifier 3.     Goal Description Pt will report difficulty getting to sleep no > 1X/wk due to neck symptoms   Target Date 11/07/20   Ortho Goal 4   Goal Identifier 4.   Goal Description Pt will be independent and consistent w/HEP   Target Date 11/07/20   Total Evaluation Time   PT Marcelo Low Complexity Minutes (22726) 30     Thank you for this referral,    Alexandra Waite, PT,  CEAS   [id number]  [facility]  [phone]

## 2020-09-23 ENCOUNTER — HOSPITAL ENCOUNTER (OUTPATIENT)
Dept: OCCUPATIONAL THERAPY | Facility: CLINIC | Age: 25
Setting detail: THERAPIES SERIES
End: 2020-09-23
Attending: NURSE PRACTITIONER
Payer: COMMERCIAL

## 2020-09-23 ENCOUNTER — HOSPITAL ENCOUNTER (OUTPATIENT)
Dept: PHYSICAL THERAPY | Facility: CLINIC | Age: 25
Setting detail: THERAPIES SERIES
End: 2020-09-23
Attending: PHYSICIAN ASSISTANT
Payer: COMMERCIAL

## 2020-09-23 DIAGNOSIS — M79.602 PAIN OF LEFT UPPER EXTREMITY: ICD-10-CM

## 2020-09-23 PROCEDURE — 97165 OT EVAL LOW COMPLEX 30 MIN: CPT | Mod: GO | Performed by: OCCUPATIONAL THERAPIST

## 2020-09-23 PROCEDURE — 97140 MANUAL THERAPY 1/> REGIONS: CPT | Mod: GO | Performed by: OCCUPATIONAL THERAPIST

## 2020-09-23 PROCEDURE — 97026 INFRARED THERAPY: CPT | Mod: GO | Performed by: OCCUPATIONAL THERAPIST

## 2020-09-23 PROCEDURE — 97110 THERAPEUTIC EXERCISES: CPT | Mod: GP | Performed by: PHYSICAL THERAPIST

## 2020-09-23 PROCEDURE — 97140 MANUAL THERAPY 1/> REGIONS: CPT | Mod: GP | Performed by: PHYSICAL THERAPIST

## 2020-09-23 PROCEDURE — 97110 THERAPEUTIC EXERCISES: CPT | Mod: GO | Performed by: OCCUPATIONAL THERAPIST

## 2020-09-23 NOTE — PROGRESS NOTES
09/23/20   Hand Therapy Evaluation   General Information/History   Start Of Care Date 09/23/20   Referring Physician Emili Farr CNP   Orders Evaluate And Treat As Indicated   Orders Date 09/22/20   Medical Diagnosis Left upper extremity pain   Precautions/Limitations 20# weight restriction   Additional Occupational Profile Info/Pertinent history of current problem Patient relates that the last 3-4 months he has been having left elbow pain especially with extension. He says  that he feels a pop if lifting a lot in the day.    Previous treatment or current condition Seeing PT for neck in same side   Past medical history depression , smoker 1 PPD   How/Where did it occur From insidious onset;At home   Onset date of current episode/exacerbation 06/23/20  (approximate date)   Chronicity New   Hand Dominance Right   Affected side Left   Functional limitations perform activities of daily living;perform required work activities;perform desired leisure / sports activities   Reported Symptoms Pain;Loss of Motion/Stiffness   Prior level of function Independent ADL;Independent IADL   Important Activities bow  hunting, fishing, 4 wheeling, fishing   Patient role/Employment history Employed   Occupation window glazer   Employment Status Working in normal job with restrictions   Primary Job Tasks Lifting;Carrying;Reaching   Occupation Comments has done for 3 years   Patient/Family goals statement Patient would like to be able to use his arm as he did without soreness   Fall Risk Screen   Fall screen completed by OT   Have you fallen 2 or more times in the past year? No   Have you fallen and had an injury in the past year? No   Is patient a fall risk? No   Abuse Screen (yes response referral indicated)   Feels Unsafe at Home or Work/School no   Feels Threatened by Someone no   Does Anyone Try to Keep You From Having Contact with Others or Doing Things Outside Your Home? no   Physical Signs of Abuse Present no   Pain   Pain  Primary Pain Report   Primary Pain Report   Location ;lateral elbow   Pain Quality Sharp;Shooting;Aching   Frequency Constant   Scale 4/10;9/10   Pain Is Exacerbated By Lifting;Twisting , Pulling;Pushing  (reaching)   Pain Is Relieved By Muscle Relaxants;Rest   Progression Since Onset Unchanged   Edema   Edema Elbow Crease   Elbow Crease (measured in cm)   Elbow Crease -  - Left 28   Elbow Crease -  - Right 28.4   Tenderness   Tenderness Lateral Elbow   Lateral Elbow   Left Severe   Palpation   Palpation Assessed   Left Hand Tenderness Present At: ECRL;Lateral epicondyle   Left Hand  Palpation Comments most pain at ECRB and EDC origin 5-6/10 with light to moderate pressure Also tender with palpation at ulnar groove   ROM   ROM AROM   AROM   AROM Elbow   Comments ETL 35 bilaterally, however not able to extend elbow on the left   Elbow   Elbow Extension- Left 28   Elbow Extension - Right 0   Special Tests   Special Tests Assessed   Left Hand Positive For The Following Special Tests ULTT Ulnar NV Bias   Sensation Findings   Sensation Findings Other (see comments)   Sensation Comments no numbness and tingling  into finger tips , however does relate a shooting pain sensation into ulnar digits when he has a pop feeling in his elbow   Strength   Strength Strength;Other (see comments)    Avg - Left 44 pain in lateral elbow    Avg - Right 78    Elbow Extended Avg - Left 40 6/10    Elbow Extended Avg - Right 43   Lateral Pinch - Left 20   Lateral Pinch - Right 20   3 Point Pinch - Left 20   3 Point Pinch - Right 20   Strength Comments pain with resisted elbow extension and supination, increased pain with resistance to brachialis and BR   Education Assessment   Preferred Learning Style Demonstration   Barriers to Learning No barriers   Therapy Interventions   Planned Therapy Interventions Ultrasound;Light Therapy;ROM;Strengthening;Stretching;Manual Therapy;Splinting;Education of splint wear, care, fit and  precautions;Self Care/Home Management;Ergonomic Patient Education;Home Program   Therapy plan comments To be added as and if appropriate   Clinical Impression   Criteria for Skilled Therapeutic Interventions Met yes;treatment indicated   OT Diagnosis decreased ADL's IADL's   Influenced by the following impairments Pain;Decreased range of motion;Decreased strength   Assessment of Occupational Performance 1-3 Performance Deficits   Identified Performance Deficits carrying items , reaching for things   Clinical Decision Making (Complexity) Low complexity   Therapy Frequency 1x/week   Predicted Duration of Therapy Intervention (days/wks) 6 weeks   Risks and Benefits of Treatment have been explained. Yes   Patient, Family & other staff in agreement with plan of care Yes   Clinical Impression Comments clinical findings not clear cut  diagnosis. Suspect ulnar nerve involvement as well as lateral elbow.   Hand Eval Goals   Hand Eval Goals 1;2   Hand Goal 1   Goal Identifier carrying   Goal Description Patient to report 80% improvement in using left hand to carry items   Target Date 11/06/20   Hand Goal 2   Goal Identifier reaching   Goal Description Patient to report 75% improvement in using left hand to reach and place windows at work   Target Date 10/30/20   Total Evaluation Time   STEW Chirinos/L CHT  Occupational Therapist, Certified Hand Therapist

## 2020-09-30 ENCOUNTER — HOSPITAL ENCOUNTER (OUTPATIENT)
Dept: PHYSICAL THERAPY | Facility: CLINIC | Age: 25
Setting detail: THERAPIES SERIES
End: 2020-09-30
Attending: PHYSICIAN ASSISTANT
Payer: COMMERCIAL

## 2020-09-30 ENCOUNTER — HOSPITAL ENCOUNTER (OUTPATIENT)
Dept: OCCUPATIONAL THERAPY | Facility: CLINIC | Age: 25
Setting detail: THERAPIES SERIES
End: 2020-09-30
Attending: NURSE PRACTITIONER
Payer: COMMERCIAL

## 2020-09-30 PROCEDURE — 97035 APP MDLTY 1+ULTRASOUND EA 15: CPT | Mod: GO | Performed by: OCCUPATIONAL THERAPIST

## 2020-09-30 PROCEDURE — 97140 MANUAL THERAPY 1/> REGIONS: CPT | Mod: GO | Performed by: OCCUPATIONAL THERAPIST

## 2020-09-30 PROCEDURE — 97110 THERAPEUTIC EXERCISES: CPT | Mod: GP | Performed by: PHYSICAL THERAPIST

## 2020-09-30 PROCEDURE — 97140 MANUAL THERAPY 1/> REGIONS: CPT | Mod: GP | Performed by: PHYSICAL THERAPIST

## 2020-09-30 PROCEDURE — 97026 INFRARED THERAPY: CPT | Mod: GO | Performed by: OCCUPATIONAL THERAPIST

## 2020-10-07 ENCOUNTER — HOSPITAL ENCOUNTER (OUTPATIENT)
Dept: OCCUPATIONAL THERAPY | Facility: CLINIC | Age: 25
Setting detail: THERAPIES SERIES
End: 2020-10-07
Attending: NURSE PRACTITIONER
Payer: COMMERCIAL

## 2020-10-07 PROCEDURE — 97026 INFRARED THERAPY: CPT | Mod: GO | Performed by: OCCUPATIONAL THERAPIST

## 2020-10-07 PROCEDURE — 97035 APP MDLTY 1+ULTRASOUND EA 15: CPT | Mod: GO | Performed by: OCCUPATIONAL THERAPIST

## 2020-10-07 PROCEDURE — 97140 MANUAL THERAPY 1/> REGIONS: CPT | Mod: GO | Performed by: OCCUPATIONAL THERAPIST

## 2020-10-14 ENCOUNTER — HOSPITAL ENCOUNTER (OUTPATIENT)
Dept: PHYSICAL THERAPY | Facility: CLINIC | Age: 25
Setting detail: THERAPIES SERIES
End: 2020-10-14
Attending: PHYSICIAN ASSISTANT
Payer: COMMERCIAL

## 2020-10-14 ENCOUNTER — HOSPITAL ENCOUNTER (OUTPATIENT)
Dept: OCCUPATIONAL THERAPY | Facility: CLINIC | Age: 25
Setting detail: THERAPIES SERIES
End: 2020-10-14
Attending: NURSE PRACTITIONER
Payer: COMMERCIAL

## 2020-10-14 PROCEDURE — 97140 MANUAL THERAPY 1/> REGIONS: CPT | Mod: GP | Performed by: PHYSICAL THERAPIST

## 2020-10-14 PROCEDURE — 97110 THERAPEUTIC EXERCISES: CPT | Mod: GP | Performed by: PHYSICAL THERAPIST

## 2020-10-14 PROCEDURE — 97026 INFRARED THERAPY: CPT | Mod: GO | Performed by: OCCUPATIONAL THERAPIST

## 2020-10-14 PROCEDURE — 97140 MANUAL THERAPY 1/> REGIONS: CPT | Mod: GO | Performed by: OCCUPATIONAL THERAPIST

## 2020-10-14 PROCEDURE — 97035 APP MDLTY 1+ULTRASOUND EA 15: CPT | Mod: GO | Performed by: OCCUPATIONAL THERAPIST

## 2020-11-24 NOTE — PROGRESS NOTES
"   OUTPATIENT PHYSICAL THERAPY DISCHARGE SUMMARY   Roshni Luna, RITESH  9/16/20 to 10/14/20 1000   Signing Clinician's Name / Credentials   Signing clinician's name / credentials Alexandra Waite, PT [id number]   Session Number   Session Number 4   Ortho Goal 1   Goal Identifier 1.     Goal Description Pt will be able to turn his head w/ ADL's w/ pain no > 2/10   Target Date 10/18/20   Ortho Goal 2   Goal Identifier 2.   Goal Description Pt will be able to lift 50# w/ pain no > 4/10   Target Date 11/07/20   Ortho Goal 3   Goal Identifier 3.     Goal Description Pt will report difficulty getting to sleep no > 1X/wk due to neck symptoms   Target Date 11/07/20   Ortho Goal 4   Goal Identifier 4.   Goal Description Pt will be independent and consistent w/HEP   Target Date 11/07/20   Subjective Report   Subjective Report Pt notes ongoing soreness 4.5/10.  Pt lost his ex sheet therefore slacked off last week.  Pt is working regular duty..  Pt states he is getting over a 3 day migraine (history of this---pt states he plans to schedule MD appt re: this and history of  concussions since middle school)   Objective Measures    CROM flex WNLnotes tenderness/ ext 80% notes pain w/ ext . Rot B 90%    Occiput R mild tenderness, L mod tenderness    L shoulder AROM flex 142*,  abd 145*, ER 65*, IR to T9   Therapeutic Procedure/exercise   Treatment Detail Wall flex L  X 5.  Wand scaption x 10.  Wand flex supine x 5 (can also do clasped hand).  Thoracic rotation in SL x 3 B.   1 arm pec stretch on wall 30\" .     LT sets.    cuing on posture.   (UT stretch/RTB rows/ low row on own.)   Manual Therapy   Treatment Detail Occipital releases.  MET B 1st rib.  direct inferior glides to L 1st rib.    Tool assisted STM to L UT/levator/ scalenes seated w/ pillow   Plan   Home program ex as above   Plan Pt failed to show for appt on 10/21/20 and did not reschedule.  Discharge from physical therapy as pt has not been seen in past 30 days   comments Pt " continued to work towards goals.

## 2021-02-08 NOTE — PROGRESS NOTES
10/14/20  Note: This is a copy of patient's last daily visit and will also serve as their Discharge Summary as they have not been in for further treatment 30 days past this date. Final assessment of goals and physical and functional status , therefore unavailable.     Signing Clinician's Name / Credentials   Signing clinician's name / credentials STEW Chirinos/L CHT   Session Number   Session Number 4 ( reporting period 9/23/20 to 10/14/20)   Authorization status [id number]   Quick Add   Quick Add  Hand   Hand   Referring Physician Emili Farr CNP   Medical Diagnosis Left upper extremity pain   Orders Evaluate And Treat As Indicated   Adult OT Eval Goals   Hand Eval Goals 1;2   Hand Goal 1   Goal Identifier carrying- 20 percent better   Goal Description Patient to report 80% improvement in using left hand to carry items   Target Date 11/06/20   Hand Goal 2   Goal Identifier reaching- getting better   Goal Description Patient to report 75% improvement in using left hand to reach and place windows at work   Target Date 10/30/20   Subjective Report   Subjective Report Not constantly a pain anymore   Patient Reported % Functional Improvement 5   Initial Pain level 9/10  (at worst 4 at best)   Current Pain level 3/10   Objective Measures   ROM Location (anatomical) elbow   ROM Location Left   Motion Extension   ROM Comments 10   Modalities   Modalities Infrared;Ultrasound    Infrared   Infrared Treatment Minutes (53490) 2 Minutes   Skilled Intervention to enhance tissue repair   Treatment Detail light to lateral elbow and cubital tunnel   Ultrasound   Ultrasound, Minutes (15851) 8 Minutes   Skilled Intervention to enhance blood flow for healing   Treatment Detail 3mhz, 50% pulsed to lateral elbow at 1.0w/cm2   Therapeutic Interventions   Therapeutic Interventions Manual Therapy;Therapeutic Procedure/Exercise;Self Care/Home Management   Therapeutic Procedure/Exercise   Therapeutic Procedure: strength, endurance, ROM,  flexibility minutes (83836) 5   Skilled Intervention verbal and physical cuing needed   Treatment Detail eccentric wrist extension es   Manual Therapy   Manual Therapy Minutes (55891) 12   Skilled Intervention to enhance tissue repair ,increase blood flow for healing   Treatment Detail augmented STM with Gua Sha tools to flexors, extensors, Biceps, Triceps followed by stretch   Assessments Completed   Assessments Completed ese added ex well and making progress toward goals   Education   Learner Patient   Readiness Eager   Method Booklet/handout;Explanation;Demonstration   Response Verbalizes understanding;Demonstrates understanding   Plan   Home program ex as above, activity modification, sleep positioning   Plan for next session continue to see patient weekly coordinating with PT visits- add ex as appropriate and tolerated   Total Session Time

## 2021-10-12 ENCOUNTER — OFFICE VISIT (OUTPATIENT)
Dept: URGENT CARE | Facility: URGENT CARE | Age: 26
End: 2021-10-12
Payer: COMMERCIAL

## 2021-10-12 VITALS
SYSTOLIC BLOOD PRESSURE: 143 MMHG | TEMPERATURE: 98.3 F | HEART RATE: 54 BPM | DIASTOLIC BLOOD PRESSURE: 84 MMHG | OXYGEN SATURATION: 99 %

## 2021-10-12 DIAGNOSIS — R05.9 COUGH: ICD-10-CM

## 2021-10-12 DIAGNOSIS — R07.0 THROAT PAIN: Primary | ICD-10-CM

## 2021-10-12 LAB — DEPRECATED S PYO AG THROAT QL EIA: NEGATIVE

## 2021-10-12 PROCEDURE — 99000 SPECIMEN HANDLING OFFICE-LAB: CPT | Performed by: PHYSICIAN ASSISTANT

## 2021-10-12 PROCEDURE — 99213 OFFICE O/P EST LOW 20 MIN: CPT | Performed by: PHYSICIAN ASSISTANT

## 2021-10-12 PROCEDURE — U0003 INFECTIOUS AGENT DETECTION BY NUCLEIC ACID (DNA OR RNA); SEVERE ACUTE RESPIRATORY SYNDROME CORONAVIRUS 2 (SARS-COV-2) (CORONAVIRUS DISEASE [COVID-19]), AMPLIFIED PROBE TECHNIQUE, MAKING USE OF HIGH THROUGHPUT TECHNOLOGIES AS DESCRIBED BY CMS-2020-01-R: HCPCS | Mod: 90 | Performed by: PHYSICIAN ASSISTANT

## 2021-10-12 PROCEDURE — 87651 STREP A DNA AMP PROBE: CPT | Performed by: PHYSICIAN ASSISTANT

## 2021-10-12 ASSESSMENT — ENCOUNTER SYMPTOMS
FEVER: 0
HEADACHES: 1
COUGH: 1
SORE THROAT: 1
DIZZINESS: 1
DIARRHEA: 1
SHORTNESS OF BREATH: 1
FATIGUE: 1
RHINORRHEA: 0
NAUSEA: 1
MYALGIAS: 0
VOMITING: 1

## 2021-10-12 NOTE — PATIENT INSTRUCTIONS
Patient Education     When You Have a Sore Throat  A sore throat can be painful. There are many reasons why you may have a sore throat. Your healthcare provider will work with you to find the cause of your sore throat. He or she will also find the best treatment for you.     What causes a sore throat?  Sore throats can be caused or worsened by:     Cold or flu viruses    Bacteria    Irritants such as tobacco smoke or air pollution    Acid reflux  A healthy throat  The tonsils are on the sides of the throat near the base of the tongue. They collect viruses and bacteria and help fight infection. The throat (pharynx) is the passage for air. Mucus from the nasal cavity also moves down the passage.   An inflamed throat  The tonsils and pharynx can become inflamed due to a cold or flu virus. Postnasal drip (excess mucus draining from the nasal cavity) can irritate the throat. It can also make the throat or tonsils more likely to be infected by bacteria. Severe, untreated tonsillitis in children or adults can cause a pocket of pus (abscess) to form near the tonsil.   Your evaluation  A health evaluation can help find the cause of your sore throat. It can also help your healthcare provider choose the best treatment for you. The evaluation may include a health history, physical exam, and diagnostic tests.   Health history  Your healthcare provider may ask you the following:     How long has the sore throat lasted and how have you been treating it?    Do you have any other symptoms, such as body aches, fever, or cough?    Does your sore throat recur? If so, how often? How many days of school or work have you missed because of a sore throat?    Do you have trouble eating or swallowing?    Have you been told that you snore or have other sleep problems?    Do you have bad breath?    Do you cough up bad-tasting mucus?  Physical exam  During the exam, your healthcare provider checks your ears, nose, and throat for problems. He  or she also checks for swelling in the neck, and may listen to your chest.   Possible tests  Other tests your healthcare provider may perform include:     A throat swab to check for bacteria such as streptococcus (the bacteria that causes strep throat)    A blood test to check for mononucleosis (a viral infection)    A chest X-ray to rule out pneumonia, especially if you have a cough  Treating a sore throat  Treatment depends on many factors. What is the likely cause? Is the problem recent? Does it keep coming back? In many cases, the best thing to do is to treat the symptoms, rest, and let the problem heal itself. Antibiotics may help clear up some bacterial infections. For cases of severe or recurring tonsillitis, the tonsils may need to be removed.   Relieving your symptoms    Don t smoke, and stay away from secondhand smoke.    For children, try throat sprays or frozen ice pops. Adults and older children may try lozenges.    Drink warm liquids to soothe the throat and help thin mucus. Stay away from alcohol, spicy foods, and acidic drinks such as orange juice. These can irritate the throat.    Gargle with warm saltwater ( 1 teaspoon of salt to  8 ounces of warm water).    Use a humidifier to keep air moist and relieve throat dryness.    Try over-the-counter pain relievers such as acetaminophen or ibuprofen. Use as directed, and don t exceed the recommended dose. Don t give aspirin to children under age17.    Are antibiotics needed?  If your sore throat is due to a bacterial infection, antibiotics may speed healing and prevent complications. Although group A streptococcus (strep throat) is the major treatable infection for a sore throat, strep throat causes only 5% to 15% of sore throats in adults who seek medical care. Most sore throats are caused by cold or flu viruses. And antibiotics don t treat viral illness. In fact, using antibiotics when they re not needed may lead to bacteria that are harder to kill.  Your healthcare provider will prescribe antibiotics only if he or she thinks they are likely to help.   If antibiotics are prescribed  Take the medicine exactly as directed. Be sure to finish your prescription even if you re feeling better. Ask your healthcare provider or pharmacist what side effects are common and what to do about them.   Is surgery needed?  In some cases, tonsils need to be removed. This is often done as outpatient (same-day) surgery. Your healthcare provider may advise removing the tonsils in cases of:     Several severe bouts of tonsillitis in a year.  Severe  episodes include those that lead to missed days of school or work, or that need to be treated with antibiotics.    Tonsillitis that causes breathing problems during sleep    Tonsillitis caused by food particles collecting in pouches in the tonsils (cryptic tonsillitis)  When to call your healthcare provider   Call your healthcare provider immediately if any of the following occur:     Problems swallowing    Symptoms worsen, or new symptoms develop.    Swollen tonsils make breathing difficult.    The pain is severe enough to keep you from drinking liquids.    If a skin rash or hives, develops, call your healthcare provider immediately. Any of these could signal an allergic reaction to antibiotics.    Symptoms don t improve within a week.    Symptoms don t improve within  2 to 3  days of starting antibiotics.  Call 911  Call 911 if any of the following occur:     Trouble breathing or problems catching your breath may be a medical emergency.    Skin is blue, purple or gray in color    Trouble talking    Feeling dizzy or faint    Feeling of doom  Marlene last reviewed this educational content on 7/1/2019 2000-2021 [employer]. All rights reserved. This information is not intended as a substitute for professional medical care. Always follow your healthcare professional's instructions.           Patient Education   After Your  "COVID-19 (Coronavirus) Test  You have been tested for COVID-19 (coronavirus).   If you'll have surgery in the next few days, we'll let you know ahead of time if you have the virus. Please call your surgeon's office with any questions.  For all other patients: Results are usually available in [employer] within 2 to 3 days.   If you do not have a [employer] account, you'll get a letter in the mail in about 7 to 10 days.   [employer] is often the fastest way to get test results. Please sign up if you do not already have a [employer] account. See the handout Getting COVID-19 Test Results in [employer] for help.  What if my test result is positive?  If your test is positive and you have not viewed your result in [employer]oozint, you'll get a phone call with your result. (A positive test means that you have the virus.)     Follow the tips under \"How do I self-isolate?\" below for 10 days (20 days if you have a weak immune system).    You don't need to be retested for COVID-19 before going back to school or work. As long as you're fever-free and feeling better, you can go back to school, work and other activities after waiting the 10 or 20 days.  What if I have questions after I get my results?  If you have questions about your results, please visit our testing website at [email or web address].   After 7 to 10 days, if you have not gotten your results:     Call [phone] ([phone]) and ask to speak with our COVID-19 results team.    If you're being treated at an infusion center: Call your infusion center directly.  What are the symptoms of COVID-19?  Cough, fever and trouble breathing are the most common signs of COVID-19.  Other symptoms can include new headaches, new muscle or body aches, new and unexplained fatigue (feeling very tired), chills, sore throat, congestion (stuffy or runny nose), diarrhea (loose poop), loss of taste or smell, belly pain, and nausea or vomiting (feeling sick to your stomach or " "throwing up).  You may already have symptoms of COVID-19, or they may show up later.  What should I do if I have symptoms?  If you're having surgery: Call your surgeon's office.  For all other patients: Stay home and away from others (self-isolate) until ...    You've had no fever--and no medicine that reduces fever--for 1 full day (24 hours), AND    Other symptoms have gotten better. For example, your cough or breathing has improved, AND    At least 10 days have passed since your symptoms first started.  How do I self-isolate?    Stay in your own room, even for meals. Use your own bathroom if you can.    Stay away from others in your home. No hugging, kissing or shaking hands. No visitors.    Don't go to work, school or anywhere else.    Clean \"high touch\" surfaces often (doorknobs, counters, handles). Use household cleaning spray or wipes. You'll find a full list of  on the EPA website: www.epa.gov/pesticide-registration/list-n-disinfectants-use-against-sars-cov-2.    Cover your mouth and nose with a mask or other face covering to avoid spreading germs.    Wash your hands and face often. Use soap and water.    Caregivers in these groups are at risk for severe illness due to COVID-19:  ? People 65 years and older  ? People who live in a nursing home or long-term care facility  ? People with chronic disease (lung, heart, cancer, diabetes, kidney, liver, immunologic)  ? People who have a weakened immune system, including those who:    Are in cancer treatment    Take medicine that weakens the immune system, such as corticosteroids    Had a bone marrow or organ transplant    Have an immune deficiency    Have poorly controlled HIV or AIDS    Are obese (body mass index of 40 or higher)    Smoke regularly    Caregivers should wear gloves while washing dishes, handling laundry and cleaning bedrooms and bathrooms.    Use caution when washing and drying laundry: Don't shake dirty laundry and use the warmest water " setting that you can.    For more tips on managing your health at home, go to www.cdc.gov/coronavirus/2019-ncov/downloads/10Things.pdf.  How can I take care of myself at home?  1. Get lots of rest. Drink extra fluids (unless a doctor has told you not to).  2. Take Tylenol (acetaminophen) for fever or pain. If you have liver or kidney problems, ask your family doctor if it's OK to take Tylenol.   Adults can take either:  ? 650 mg (two 325 mg pills) every 4 to 6 hours, or   ? 1,000 mg (two 500 mg pills) every 8 hours as needed.  ? Note: Don't take more than 3,000 mg in one day. Acetaminophen is found in many medicines (both prescribed and over-the-counter medicines). Read all labels to be sure you don't take too much.   For children, check the Tylenol bottle for the right dose. The dose is based on the child's age or weight.  3. If you have other health problems (like cancer, heart failure, an organ transplant or severe kidney disease): Call your specialty clinic if you don't feel better in the next 2 days.  4. Know when to call 911. Emergency warning signs include:  ? Trouble breathing or shortness of breath  ? Chest pain or pressure that doesn't go away  ? Feeling confused like you haven't felt before, or not being able to wake up  ? Bluish-colored lips or face  5. If your doctor prescribed a blood thinner medicine: Follow their instructions.  Where can I get more information?    [facility] - About COVID-19:   [email or web address]    CDC - If You're Sick: cdc.gov/coronavirus/2019-ncov/about/steps-when-sick.html    CDC - Ending Home Isolation: www.cdc.gov/coronavirus/2019-ncov/hcp/disposition-in-home-patients.html    CDC - Caring for Someone: www.cdc.gov/coronavirus/2019-ncov/if-you-are-sick/care-for-someone.html    [facility] - Interim Guidance for Hospital Discharge to Home: www.health.[facility].[place]/diseases/coronavirus/hcp/hospdischarge.pdf    [facility] clinical trials (COVID-19 research  studies): [email or web address]    Below are the COVID-19 hotlines at the [place] Department of [facility] ([facility]). Interpreters are available.  ? For health questions: Call 855-190-9331 or 1-335.493.3062 (7 a.m. to 7 p.m.)  ? For questions about schools and childcare: Call 702-917-5400 or 1-711.564.5108 (7 a.m. to 7 p.m.)    For informational purposes only. Not to replace the advice of your health care provider. Clinically reviewed by Infection Prevention and the [facility] COVID-19 Clinical Team. Copyright   2020 [facility]. All rights reserved. [employer] 182476 - Rev 11/11/20.

## 2021-10-12 NOTE — LETTER
[facility] [place]  [address] [facility] [address]  Phone: [phone]    October 12, 2021        Anthony Edwards  [address] [place] [facility] [place] [facility] [address]          To whom it may concern:    RE: Anthony Edwards    Patient was seen and treated today at our clinic.  Patient may return to work if fever free for 24 hours without the use of tylenol/motrin and with a negative COVID test result.  COVID test pending.      Please contact me for questions or concerns.      Sincerely,        Ashley Jules

## 2021-10-12 NOTE — PROGRESS NOTES
SUBJECTIVE:   Anthony Edwards is a 26 year old male presenting with a chief complaint of   Chief Complaint   Patient presents with     Pharyngitis     Started thrusday with sore throat, friday the dizziness started, this morning while brushing teeth stated gaging and coughed up some blood.        He is an established patient of [facility].  Patient presents with dizziness and ST x 5 days.  Some right side ear pain.   Fever 101.8 on 3 days ago.  Productive cough - clear and sometimes green, recently blood tinged.   Vomited x 7-8 times, last vomited this morning (while brushing teeth).  Diarrhea x 2 weeks, 2/daily - no blood.  No recent abx use. No recent hospitalizations, no travel.  No hx of BP problems.  No vaccination or hx of COVID.      Treatment:  mucinex and dayquil (6:30 am today)    PMHx:  Migraines, ADHD  Medications:  None  Surgeries: none  Allergies:  None  Social:  Smoke daily        Review of Systems   Constitutional: Positive for fatigue. Negative for fever.   HENT: Positive for ear pain and sore throat. Negative for congestion, rhinorrhea and sneezing.    Respiratory: Positive for cough and shortness of breath.    Cardiovascular: Negative for chest pain.   Gastrointestinal: Positive for diarrhea, nausea and vomiting.   Musculoskeletal: Negative for myalgias.   Skin: Negative for rash.   Neurological: Positive for dizziness and headaches.   All other systems reviewed and are negative.      History reviewed. No pertinent past medical history.  Family History   Problem Relation Age of Onset     Heart Failure Paternal Grandfather      Current Outpatient Medications   Medication Sig Dispense Refill     albuterol (PROAIR HFA/PROVENTIL HFA/VENTOLIN HFA) 108 (90 Base) MCG/ACT inhaler Inhale 2 puffs into the lungs every 4 hours as needed (Patient not taking: Reported on 10/12/2021) 1 Inhaler 3     cyclobenzaprine (FLEXERIL) 10 MG tablet Take 1 tablet (10 mg) by mouth 3 times daily as needed for muscle  spasms (Patient not taking: Reported on 10/12/2021) 30 tablet 0     fluticasone (FLONASE) 50 MCG/ACT nasal spray Spray 1-2 sprays into both nostrils daily (Patient not taking: Reported on 10/12/2021) 16 g 0     fluticasone (FLONASE) 50 MCG/ACT nasal spray USE ONE TO TWO SPRAY(S) IN EACH NOSTRIL ONCE DAILY (Patient not taking: Reported on 10/12/2021) 9.9 mL 1     ibuprofen (ADVIL,MOTRIN) 200 MG tablet Take 400 mg by mouth (Patient not taking: Reported on 10/12/2021)       loratadine (CLARITIN) 10 MG tablet Take 10 mg by mouth (Patient not taking: Reported on 10/12/2021)       meclizine (ANTIVERT) 25 MG tablet Take 1 tablet (25 mg) by mouth 3 times daily as needed for dizziness (Patient not taking: Reported on 10/12/2021) 30 tablet 1     montelukast (SINGULAIR) 10 MG tablet Take 10 mg by mouth (Patient not taking: Reported on 10/12/2021)       naproxen (NAPROSYN) 500 MG tablet Take 1 tablet (500 mg) by mouth 2 times daily (with meals) (Patient not taking: Reported on 10/12/2021) 60 tablet 1     olopatadine (PATANOL) 0.1 % ophthalmic solution Place 1 drop into both eyes 2 times daily (Patient not taking: Reported on 10/12/2021) 5 mL 1     predniSONE (DELTASONE) 20 MG tablet Take one tablet twice a day for 5 days. (Patient not taking: Reported on 9/9/2020) 10 tablet 0     sertraline (ZOLOFT) 50 MG tablet Take 1 tablet (50 mg) by mouth daily (Patient not taking: Reported on 9/9/2020) 30 tablet 2     SUMAtriptan (IMITREX) 50 MG tablet Take 1 tablet (50 mg) by mouth at onset of headache for migraine May repeat in 2 hours. Max 4 tablets/24 hours. (Patient not taking: Reported on 10/12/2021) 6 tablet 11     Social History     Tobacco Use     Smoking status: Current Some Day Smoker     Types: Cigarettes     Smokeless tobacco: Never Used   Substance Use Topics     Alcohol use: Yes     Alcohol/week: 0.0 standard drinks     Comment: social       OBJECTIVE  BP (!) 143/84   Pulse 54   Temp 98.3  F (36.8  C) (Tympanic)   SpO2  99%     Physical Exam  Vitals and nursing note reviewed.   Constitutional:       General: He is not in acute distress.     Appearance: Normal appearance. He is obese. He is not ill-appearing, toxic-appearing or diaphoretic.   HENT:      Head: Normocephalic and atraumatic.      Right Ear: Tympanic membrane, ear canal and external ear normal.      Left Ear: Tympanic membrane, ear canal and external ear normal.      Nose: Nose normal.      Mouth/Throat:      Mouth: Mucous membranes are moist.      Pharynx: Oropharynx is clear. Posterior oropharyngeal erythema present.   Eyes:      Extraocular Movements: Extraocular movements intact.      Conjunctiva/sclera: Conjunctivae normal.   Cardiovascular:      Rate and Rhythm: Normal rate and regular rhythm.      Pulses: Normal pulses.      Heart sounds: Normal heart sounds.   Pulmonary:      Effort: Pulmonary effort is normal.      Breath sounds: Normal breath sounds.   Musculoskeletal:      Cervical back: Normal range of motion and neck supple. No muscular tenderness.   Skin:     General: Skin is warm and dry.   Neurological:      General: No focal deficit present.      Mental Status: He is alert.   Psychiatric:         Mood and Affect: Mood normal.         Behavior: Behavior normal.         Labs:  Results for orders placed or performed in visit on 10/12/21 (from the past 24 hour(s))   Streptococcus A Rapid Screen w/Reflex to PCR - Clinic Collect    Specimen: Throat; Swab   Result Value Ref Range    Group A Strep antigen Negative Negative       X-Ray was not done.    ASSESSMENT:      ICD-10-CM    1. Throat pain  R07.0 Streptococcus A Rapid Screen w/Reflex to PCR - Clinic Collect     Symptomatic COVID-19 Virus (Coronavirus) by PCR Nose     Group A Streptococcus PCR Throat Swab   2. Cough  R05.9 Symptomatic COVID-19 Virus (Coronavirus) by PCR Nose        Medical Decision Making:    Differential Diagnosis:  URI Adult/Peds:  Strep pharyngitis, Viral pharyngitis, Viral upper  respiratory illness and covid      Serious Comorbid Conditions:  Adult:  reviewed    PLAN:  Tylenol/motrin as needed.  Drink plenty of water.  Gargle with salt water.  May use chloraseptic spray as directed for ST.  Strep pcr pending.  Discussed and recommended CDC guidelines for self isolation.  COVID test pending.        Followup:    If not improving or if condition worsens, follow up with your Primary Care Provider, If not improving or if conditions worsens over the next 12-24 hours, go to the Emergency Department    Patient Instructions     Patient Education     When You Have a Sore Throat  A sore throat can be painful. There are many reasons why you may have a sore throat. Your healthcare provider will work with you to find the cause of your sore throat. He or she will also find the best treatment for you.     What causes a sore throat?  Sore throats can be caused or worsened by:     Cold or flu viruses    Bacteria    Irritants such as tobacco smoke or air pollution    Acid reflux  A healthy throat  The tonsils are on the sides of the throat near the base of the tongue. They collect viruses and bacteria and help fight infection. The throat (pharynx) is the passage for air. Mucus from the nasal cavity also moves down the passage.   An inflamed throat  The tonsils and pharynx can become inflamed due to a cold or flu virus. Postnasal drip (excess mucus draining from the nasal cavity) can irritate the throat. It can also make the throat or tonsils more likely to be infected by bacteria. Severe, untreated tonsillitis in children or adults can cause a pocket of pus (abscess) to form near the tonsil.   Your evaluation  A health evaluation can help find the cause of your sore throat. It can also help your healthcare provider choose the best treatment for you. The evaluation may include a health history, physical exam, and diagnostic tests.   Health history  Your healthcare provider may ask you the following:     How  long has the sore throat lasted and how have you been treating it?    Do you have any other symptoms, such as body aches, fever, or cough?    Does your sore throat recur? If so, how often? How many days of school or work have you missed because of a sore throat?    Do you have trouble eating or swallowing?    Have you been told that you snore or have other sleep problems?    Do you have bad breath?    Do you cough up bad-tasting mucus?  Physical exam  During the exam, your healthcare provider checks your ears, nose, and throat for problems. He or she also checks for swelling in the neck, and may listen to your chest.   Possible tests  Other tests your healthcare provider may perform include:     A throat swab to check for bacteria such as streptococcus (the bacteria that causes strep throat)    A blood test to check for mononucleosis (a viral infection)    A chest X-ray to rule out pneumonia, especially if you have a cough  Treating a sore throat  Treatment depends on many factors. What is the likely cause? Is the problem recent? Does it keep coming back? In many cases, the best thing to do is to treat the symptoms, rest, and let the problem heal itself. Antibiotics may help clear up some bacterial infections. For cases of severe or recurring tonsillitis, the tonsils may need to be removed.   Relieving your symptoms    Don t smoke, and stay away from secondhand smoke.    For children, try throat sprays or frozen ice pops. Adults and older children may try lozenges.    Drink warm liquids to soothe the throat and help thin mucus. Stay away from alcohol, spicy foods, and acidic drinks such as orange juice. These can irritate the throat.    Gargle with warm saltwater ( 1 teaspoon of salt to  8 ounces of warm water).    Use a humidifier to keep air moist and relieve throat dryness.    Try over-the-counter pain relievers such as acetaminophen or ibuprofen. Use as directed, and don t exceed the recommended dose. Don t give  aspirin to children under age17.    Are antibiotics needed?  If your sore throat is due to a bacterial infection, antibiotics may speed healing and prevent complications. Although group A streptococcus (strep throat) is the major treatable infection for a sore throat, strep throat causes only 5% to 15% of sore throats in adults who seek medical care. Most sore throats are caused by cold or flu viruses. And antibiotics don t treat viral illness. In fact, using antibiotics when they re not needed may lead to bacteria that are harder to kill. Your healthcare provider will prescribe antibiotics only if he or she thinks they are likely to help.   If antibiotics are prescribed  Take the medicine exactly as directed. Be sure to finish your prescription even if you re feeling better. Ask your healthcare provider or pharmacist what side effects are common and what to do about them.   Is surgery needed?  In some cases, tonsils need to be removed. This is often done as outpatient (same-day) surgery. Your healthcare provider may advise removing the tonsils in cases of:     Several severe bouts of tonsillitis in a year.  Severe  episodes include those that lead to missed days of school or work, or that need to be treated with antibiotics.    Tonsillitis that causes breathing problems during sleep    Tonsillitis caused by food particles collecting in pouches in the tonsils (cryptic tonsillitis)  When to call your healthcare provider   Call your healthcare provider immediately if any of the following occur:     Problems swallowing    Symptoms worsen, or new symptoms develop.    Swollen tonsils make breathing difficult.    The pain is severe enough to keep you from drinking liquids.    If a skin rash or hives, develops, call your healthcare provider immediately. Any of these could signal an allergic reaction to antibiotics.    Symptoms don t improve within a week.    Symptoms don t improve within  2 to 3  days of starting  "antibiotics.  Call 911  Call 911 if any of the following occur:     Trouble breathing or problems catching your breath may be a medical emergency.    Skin is blue, purple or gray in color    Trouble talking    Feeling dizzy or faint    Feeling of doom  Marlene last reviewed this educational content on 7/1/2019 2000-2021 [employer]. All rights reserved. This information is not intended as a substitute for professional medical care. Always follow your healthcare professional's instructions.           Patient Education   After Your COVID-19 (Coronavirus) Test  You have been tested for COVID-19 (coronavirus).   If you'll have surgery in the next few days, we'll let you know ahead of time if you have the virus. Please call your surgeon's office with any questions.  For all other patients: Results are usually available in [facility] within 2 to 3 days.   If you do not have a [employer] account, you'll get a letter in the mail in about 7 to 10 days.   [employer] is often the fastest way to get test results. Please sign up if you do not already have a [employer] account. See the handout Getting COVID-19 Test Results in [employer] for help.  What if my test result is positive?  If your test is positive and you have not viewed your result in [employer], you'll get a phone call with your result. (A positive test means that you have the virus.)     Follow the tips under \"How do I self-isolate?\" below for 10 days (20 days if you have a weak immune system).    You don't need to be retested for COVID-19 before going back to school or work. As long as you're fever-free and feeling better, you can go back to school, work and other activities after waiting the 10 or 20 days.  What if I have questions after I get my results?  If you have questions about your results, please visit our testing website at [email or web address].   After 7 to 10 days, if you have not gotten your results:     Call " "[phone] ([phone]) and ask to speak with our COVID-19 results team.    If you're being treated at an infusion center: Call your infusion center directly.  What are the symptoms of COVID-19?  Cough, fever and trouble breathing are the most common signs of COVID-19.  Other symptoms can include new headaches, new muscle or body aches, new and unexplained fatigue (feeling very tired), chills, sore throat, congestion (stuffy or runny nose), diarrhea (loose poop), loss of taste or smell, belly pain, and nausea or vomiting (feeling sick to your stomach or throwing up).  You may already have symptoms of COVID-19, or they may show up later.  What should I do if I have symptoms?  If you're having surgery: Call your surgeon's office.  For all other patients: Stay home and away from others (self-isolate) until ...    You've had no fever--and no medicine that reduces fever--for 1 full day (24 hours), AND    Other symptoms have gotten better. For example, your cough or breathing has improved, AND    At least 10 days have passed since your symptoms first started.  How do I self-isolate?    Stay in your own room, even for meals. Use your own bathroom if you can.    Stay away from others in your home. No hugging, kissing or shaking hands. No visitors.    Don't go to work, school or anywhere else.    Clean \"high touch\" surfaces often (doorknobs, counters, handles). Use household cleaning spray or wipes. You'll find a full list of  on the EPA website: www.epa.gov/pesticide-registration/list-n-disinfectants-use-against-sars-cov-2.    Cover your mouth and nose with a mask or other face covering to avoid spreading germs.    Wash your hands and face often. Use soap and water.    Caregivers in these groups are at risk for severe illness due to COVID-19:  ? People 65 years and older  ? People who live in a nursing home or long-term care facility  ? People with chronic disease (lung, heart, cancer, diabetes, kidney, " liver, immunologic)  ? People who have a weakened immune system, including those who:    Are in cancer treatment    Take medicine that weakens the immune system, such as corticosteroids    Had a bone marrow or organ transplant    Have an immune deficiency    Have poorly controlled HIV or AIDS    Are obese (body mass index of 40 or higher)    Smoke regularly    Caregivers should wear gloves while washing dishes, handling laundry and cleaning bedrooms and bathrooms.    Use caution when washing and drying laundry: Don't shake dirty laundry and use the warmest water setting that you can.    For more tips on managing your health at home, go to www.cdc.gov/coronavirus/2019-ncov/downloads/10Things.pdf.  How can I take care of myself at home?  1. Get lots of rest. Drink extra fluids (unless a doctor has told you not to).  2. Take Tylenol (acetaminophen) for fever or pain. If you have liver or kidney problems, ask your family doctor if it's OK to take Tylenol.   Adults can take either:  ? 650 mg (two 325 mg pills) every 4 to 6 hours, or   ? 1,000 mg (two 500 mg pills) every 8 hours as needed.  ? Note: Don't take more than 3,000 mg in one day. Acetaminophen is found in many medicines (both prescribed and over-the-counter medicines). Read all labels to be sure you don't take too much.   For children, check the Tylenol bottle for the right dose. The dose is based on the child's age or weight.  3. If you have other health problems (like cancer, heart failure, an organ transplant or severe kidney disease): Call your specialty clinic if you don't feel better in the next 2 days.  4. Know when to call 911. Emergency warning signs include:  ? Trouble breathing or shortness of breath  ? Chest pain or pressure that doesn't go away  ? Feeling confused like you haven't felt before, or not being able to wake up  ? Bluish-colored lips or face  5. If your doctor prescribed a blood thinner medicine: Follow their instructions.  Where can I  get more information?    [facility] - About COVID-19:   [email or web address]    CDC - If You're Sick: cdc.gov/coronavirus/2019-ncov/about/steps-when-sick.html    CDC - Ending Home Isolation: www.cdc.gov/coronavirus/2019-ncov/hcp/disposition-in-home-patients.html    CDC - Caring for Someone: www.cdc.gov/coronavirus/2019-ncov/if-you-are-sick/care-for-someone.html    [facility] - Interim Guidance for Hospital Discharge to Home: www.health.[email or web address]./diseases/coronavirus/hcp/hospdischarge.pdf    [facility] clinical trials (COVID-19 research studies): [email or web address]    Below are the COVID-19 hotlines at the [place] Department of Health ([facility]). Interpreters are available.  ? For health questions: Call 612-739-7755 or 1-824.185.6646 (7 a.m. to 7 p.m.)  ? For questions about schools and childcare: Call 482-466-6241 or 1-804.834.6224 (7 a.m. to 7 p.m.)    For informational purposes only. Not to replace the advice of your health care provider. Clinically reviewed by Infection Prevention and the [facility] COVID-19 Clinical Team. Copyright   2020 [facility]. All rights reserved. [employer] 826563 - Rev 11/11/20.

## 2021-10-13 LAB — GROUP A STREP BY PCR: NOT DETECTED

## 2021-10-15 LAB — SARS-COV-2 RNA RESP QL NAA+PROBE: NOT DETECTED

## 2021-12-04 ENCOUNTER — HEALTH MAINTENANCE LETTER (OUTPATIENT)
Age: 26
End: 2021-12-04

## 2021-12-14 ENCOUNTER — OFFICE VISIT (OUTPATIENT)
Dept: FAMILY MEDICINE | Facility: CLINIC | Age: 26
End: 2021-12-14
Payer: COMMERCIAL

## 2021-12-14 VITALS
SYSTOLIC BLOOD PRESSURE: 132 MMHG | DIASTOLIC BLOOD PRESSURE: 68 MMHG | WEIGHT: 232.8 LBS | HEART RATE: 93 BPM | OXYGEN SATURATION: 96 % | BODY MASS INDEX: 32.47 KG/M2

## 2021-12-14 DIAGNOSIS — E66.811 CLASS 1 OBESITY WITHOUT SERIOUS COMORBIDITY IN ADULT, UNSPECIFIED BMI, UNSPECIFIED OBESITY TYPE: ICD-10-CM

## 2021-12-14 DIAGNOSIS — R55 VASOVAGAL SYNCOPE: ICD-10-CM

## 2021-12-14 DIAGNOSIS — G43.909 MIGRAINE WITHOUT STATUS MIGRAINOSUS, NOT INTRACTABLE, UNSPECIFIED MIGRAINE TYPE: Primary | ICD-10-CM

## 2021-12-14 DIAGNOSIS — R05.3 CHRONIC COUGH: ICD-10-CM

## 2021-12-14 LAB
ALBUMIN SERPL-MCNC: 4.3 G/DL (ref 3.5–5)
ALP SERPL-CCNC: 87 U/L (ref 45–120)
ALT SERPL W P-5'-P-CCNC: 42 U/L (ref 0–45)
ANION GAP SERPL CALCULATED.3IONS-SCNC: 13 MMOL/L (ref 5–18)
AST SERPL W P-5'-P-CCNC: 21 U/L (ref 0–40)
BILIRUB SERPL-MCNC: 0.3 MG/DL (ref 0–1)
BUN SERPL-MCNC: 8 MG/DL (ref 8–22)
CALCIUM SERPL-MCNC: 9.4 MG/DL (ref 8.5–10.5)
CHLORIDE BLD-SCNC: 102 MMOL/L (ref 98–107)
CO2 SERPL-SCNC: 23 MMOL/L (ref 22–31)
CREAT SERPL-MCNC: 0.73 MG/DL (ref 0.7–1.3)
ERYTHROCYTE [DISTWIDTH] IN BLOOD BY AUTOMATED COUNT: 12.9 % (ref 10–15)
GFR SERPL CREATININE-BSD FRML MDRD: >90 ML/MIN/1.73M2
GLUCOSE BLD-MCNC: 111 MG/DL (ref 70–125)
HBA1C MFR BLD: 5.2 % (ref 0–5.6)
HCT VFR BLD AUTO: 45.4 % (ref 40–53)
HGB BLD-MCNC: 15.2 G/DL (ref 13.3–17.7)
MCH RBC QN AUTO: 30.2 PG (ref 26.5–33)
MCHC RBC AUTO-ENTMCNC: 33.5 G/DL (ref 31.5–36.5)
MCV RBC AUTO: 90 FL (ref 78–100)
PLATELET # BLD AUTO: 288 10E3/UL (ref 150–450)
POTASSIUM BLD-SCNC: 4.1 MMOL/L (ref 3.5–5)
PROT SERPL-MCNC: 7.6 G/DL (ref 6–8)
RBC # BLD AUTO: 5.04 10E6/UL (ref 4.4–5.9)
SODIUM SERPL-SCNC: 138 MMOL/L (ref 136–145)
TSH SERPL DL<=0.005 MIU/L-ACNC: 1.33 UIU/ML (ref 0.3–5)
WBC # BLD AUTO: 9.3 10E3/UL (ref 4–11)

## 2021-12-14 PROCEDURE — 99214 OFFICE O/P EST MOD 30 MIN: CPT | Performed by: STUDENT IN AN ORGANIZED HEALTH CARE EDUCATION/TRAINING PROGRAM

## 2021-12-14 PROCEDURE — 83036 HEMOGLOBIN GLYCOSYLATED A1C: CPT | Performed by: STUDENT IN AN ORGANIZED HEALTH CARE EDUCATION/TRAINING PROGRAM

## 2021-12-14 PROCEDURE — 84443 ASSAY THYROID STIM HORMONE: CPT | Performed by: STUDENT IN AN ORGANIZED HEALTH CARE EDUCATION/TRAINING PROGRAM

## 2021-12-14 PROCEDURE — 36415 COLL VENOUS BLD VENIPUNCTURE: CPT | Performed by: STUDENT IN AN ORGANIZED HEALTH CARE EDUCATION/TRAINING PROGRAM

## 2021-12-14 PROCEDURE — 80053 COMPREHEN METABOLIC PANEL: CPT | Performed by: STUDENT IN AN ORGANIZED HEALTH CARE EDUCATION/TRAINING PROGRAM

## 2021-12-14 PROCEDURE — 85027 COMPLETE CBC AUTOMATED: CPT | Performed by: STUDENT IN AN ORGANIZED HEALTH CARE EDUCATION/TRAINING PROGRAM

## 2021-12-14 ASSESSMENT — ANXIETY QUESTIONNAIRES
5. BEING SO RESTLESS THAT IT IS HARD TO SIT STILL: SEVERAL DAYS
7. FEELING AFRAID AS IF SOMETHING AWFUL MIGHT HAPPEN: MORE THAN HALF THE DAYS
3. WORRYING TOO MUCH ABOUT DIFFERENT THINGS: MORE THAN HALF THE DAYS
GAD7 TOTAL SCORE: 11
2. NOT BEING ABLE TO STOP OR CONTROL WORRYING: SEVERAL DAYS
4. TROUBLE RELAXING: SEVERAL DAYS
6. BECOMING EASILY ANNOYED OR IRRITABLE: MORE THAN HALF THE DAYS
1. FEELING NERVOUS, ANXIOUS, OR ON EDGE: MORE THAN HALF THE DAYS
IF YOU CHECKED OFF ANY PROBLEMS ON THIS QUESTIONNAIRE, HOW DIFFICULT HAVE THESE PROBLEMS MADE IT FOR YOU TO DO YOUR WORK, TAKE CARE OF THINGS AT HOME, OR GET ALONG WITH OTHER PEOPLE: SOMEWHAT DIFFICULT

## 2021-12-14 ASSESSMENT — PATIENT HEALTH QUESTIONNAIRE - PHQ9: SUM OF ALL RESPONSES TO PHQ QUESTIONS 1-9: 14

## 2021-12-14 NOTE — PROGRESS NOTES
Assessment and Plan     26-year-old male with past medical history of depression ADHD who presents with really episodes going on for about the last 5 months that have worsened in frequency to daily.  He describes these episodes as coughing type episodes associated with dizziness and tunnel vision where he almost will faint.  Many many associated symptoms such as mild chest discomfort, epigastric pain, right upper quadrant pain, nausea, pain with breathing, palpitations.  His NU-7 today is mild to moderate range.  After history and physical I am most suspicious for vasovagal syncope possibly exacerbated by chronic cough from vagal nerve stimulation.  Think the most likely etiology of his chronic cough is reflux as he has had some of the symptoms over the past couple weeks.  Has been put on albuterol before for respiratory symptoms but did not find this very helpful.  Cough does not seem to be more frequent in the morning that would be suggestive of postnasal drip.  I recommended empiric trial of omeprazole over the next 2 to 3 weeks as well as obtaining some basic blood work as below.  If no improvement in symptoms and no obvious etiology seen on these I would rule out more concerning cardiac etiology given chest pain palpitation symptoms with cardiac monitor and stress test.  If this were normal as well would consider treating anxiety to see if this improves symptoms.  On a side note patient discussed symptoms of migraine and associated history of 12 concussions in his life.  Given this more complex history I recommended he see neurology to work this up and treat further.  Placed referral for this    1. Chronic cough  - CBC with platelets; Future  - Comprehensive metabolic panel (BMP + Alb, Alk Phos, ALT, AST, Total. Bili, TP); Future  - TSH with free T4 reflex; Future  - omeprazole (PRILOSEC) 20 MG DR capsule; Take 1 capsule (20 mg) by mouth daily  Dispense: 30 capsule; Refill: 0    2. Vasovagal syncope  - CBC  "with platelets; Future  - Comprehensive metabolic panel (BMP + Alb, Alk Phos, ALT, AST, Total. Bili, TP); Future  - TSH with free T4 reflex; Future  - omeprazole (PRILOSEC) 20 MG DR capsule; Take 1 capsule (20 mg) by mouth daily  Dispense: 30 capsule; Refill: 0    3. Migraine without status migrainosus, not intractable, unspecified migraine type  - Adult Neurology Referral; Future    4. Class 1 obesity without serious comorbidity in adult, unspecified BMI, unspecified obesity type  - Hemoglobin A1c; Future    Follow up: 2-3 weeks if symptoms not improving  Options for treatment and follow-up care were reviewed with the patient and/or guardian. Anthony Edwards and/or guardian engaged in the decision making process and verbalized understanding of the options discussed and agreed with the final plan.    Dr. Sander Faria         HPI:   Anthony Edwards is a 26 year old  male who presents for:    Chief Complaint   Patient presents with     Chest Pain     chest pain, wakes up at night. coughing for about six months and when coughing will black out and then will have some memory issues after waking up sometimes. sharp pain and has to use bathroom after eating.      Hypertension     family hx of htn and wondering about diabetes and see if can get tested.      Patient tells me he will cough and his \"vision will go black\" or a tunnel sensation and then he will get dizzy.  Hasn't completely fainted with this. Last happened yesterday. Seems to be almost daily recently.  Doesn't seem to be a pattern to the events. Has been going on total for 4-5 months. Nothing seems to prevent it from happening. Does have associated dizziness. No numbness tingling. Some chest pain that feels like acid reflux. He did have COVID a month ago and was having francisca problems with breathing. Now still has some mild SOB and pain with breathing. Sometimes has wheezing. Sometimes  Feel a wave like sensation or ringing with black out sensations. " Sometimes palpitations with these episodes. Up maybe 12 lbs over the last couple of months. He notes that when he was younger he wore a cardiac monitor for some episodes then but was normal.           PMHX:     Patient Active Problem List   Diagnosis     Atopic rhinitis     Attention deficit hyperactivity disorder (ADHD), combined type     Major depressive disorder, single episode, mild (H)     Stimulant dependence (H)     Allergic rhinitis     ADHD (attention deficit hyperactivity disorder)       Current Outpatient Medications   Medication Sig Dispense Refill     albuterol (PROAIR HFA/PROVENTIL HFA/VENTOLIN HFA) 108 (90 Base) MCG/ACT inhaler Inhale 2 puffs into the lungs every 4 hours as needed (Patient not taking: Reported on 10/12/2021) 1 Inhaler 3     cyclobenzaprine (FLEXERIL) 10 MG tablet Take 1 tablet (10 mg) by mouth 3 times daily as needed for muscle spasms (Patient not taking: Reported on 10/12/2021) 30 tablet 0     fluticasone (FLONASE) 50 MCG/ACT nasal spray Spray 1-2 sprays into both nostrils daily (Patient not taking: Reported on 10/12/2021) 16 g 0     fluticasone (FLONASE) 50 MCG/ACT nasal spray USE ONE TO TWO SPRAY(S) IN EACH NOSTRIL ONCE DAILY (Patient not taking: Reported on 10/12/2021) 9.9 mL 1     ibuprofen (ADVIL,MOTRIN) 200 MG tablet Take 400 mg by mouth (Patient not taking: Reported on 10/12/2021)       loratadine (CLARITIN) 10 MG tablet Take 10 mg by mouth (Patient not taking: Reported on 10/12/2021)       meclizine (ANTIVERT) 25 MG tablet Take 1 tablet (25 mg) by mouth 3 times daily as needed for dizziness (Patient not taking: Reported on 10/12/2021) 30 tablet 1     montelukast (SINGULAIR) 10 MG tablet Take 10 mg by mouth (Patient not taking: Reported on 10/12/2021)       naproxen (NAPROSYN) 500 MG tablet Take 1 tablet (500 mg) by mouth 2 times daily (with meals) (Patient not taking: Reported on 10/12/2021) 60 tablet 1     olopatadine (PATANOL) 0.1 % ophthalmic solution Place 1 drop into  both eyes 2 times daily (Patient not taking: Reported on 10/12/2021) 5 mL 1     predniSONE (DELTASONE) 20 MG tablet Take one tablet twice a day for 5 days. (Patient not taking: Reported on 9/9/2020) 10 tablet 0     sertraline (ZOLOFT) 50 MG tablet Take 1 tablet (50 mg) by mouth daily (Patient not taking: Reported on 9/9/2020) 30 tablet 2     SUMAtriptan (IMITREX) 50 MG tablet Take 1 tablet (50 mg) by mouth at onset of headache for migraine May repeat in 2 hours. Max 4 tablets/24 hours. (Patient not taking: Reported on 10/12/2021) 6 tablet 11       Social History     Tobacco Use     Smoking status: Current Some Day Smoker     Types: Cigarettes     Smokeless tobacco: Never Used   Substance Use Topics     Alcohol use: Yes     Alcohol/week: 0.0 standard drinks     Comment: social     Drug use: Yes     Types: Marijuana     Comment: Off and on to help with his migraines.       Social History     Social History Narrative     Not on file       No Known Allergies    No results found for this or any previous visit (from the past 24 hour(s)).         Review of Systems:    ROS: 10 point ROS neg other than the symptoms noted above in the HPI.         Physical Exam:     Vitals:    12/14/21 1334   BP: 132/68   BP Location: Left arm   Patient Position: Sitting   Cuff Size: Adult Regular   Pulse: 93   SpO2: 96%   Weight: 105.6 kg (232 lb 12.8 oz)     Body mass index is 32.47 kg/m .    General appearance: Alert, cooperative, no distress, appears stated age  Head: Normocephalic, atraumatic, without obvious abnormality  Eyes: Pupils equal round, reactive.  Conjunctiva clear.  Nose: Nares normal, no drainage.  Throat: Lips, mucosa, tongue normal mucosa pink and moist  Neck: Supple, symmetric, trachea midline, no adenopathy.  No thyroid enlargement, tenderness or nodules.  .  Lungs: Clear to auscultation bilaterally, no wheezing or crackles present.  Respirations unlabored  Heart: Regular rate and rhythm, normal S1 and S2, no murmur, rub  or gallop.  Abdomen: Soft, tender in epigastrium and RUQm, nondistended.  Bowel sounds active in all 4 quadrants.  No masses or organomegaly.  Extremities: Extremities normal, atraumatic.  No cyanosis or edema.  Skin: Skin color, texture, turgor normal no rashes or lesions on limited skin exam  Neurologic: CN II through XII intact, normal strength.

## 2021-12-15 ASSESSMENT — ANXIETY QUESTIONNAIRES: GAD7 TOTAL SCORE: 11

## 2021-12-15 NOTE — RESULT ENCOUNTER NOTE
I called and spoke with the patient about their recent clinic visit results. I answered any questions they had.      Dr. Sander Faria

## 2022-06-20 ENCOUNTER — OFFICE VISIT (OUTPATIENT)
Dept: NEUROLOGY | Facility: CLINIC | Age: 27
End: 2022-06-20
Attending: STUDENT IN AN ORGANIZED HEALTH CARE EDUCATION/TRAINING PROGRAM
Payer: COMMERCIAL

## 2022-06-20 VITALS — SYSTOLIC BLOOD PRESSURE: 128 MMHG | DIASTOLIC BLOOD PRESSURE: 76 MMHG | HEART RATE: 81 BPM

## 2022-06-20 DIAGNOSIS — G43.909 MIGRAINE WITHOUT STATUS MIGRAINOSUS, NOT INTRACTABLE, UNSPECIFIED MIGRAINE TYPE: ICD-10-CM

## 2022-06-20 DIAGNOSIS — R40.4 TRANSIENT ALTERATION OF AWARENESS: Primary | ICD-10-CM

## 2022-06-20 PROCEDURE — 99204 OFFICE O/P NEW MOD 45 MIN: CPT | Performed by: PSYCHIATRY & NEUROLOGY

## 2022-06-20 RX ORDER — RIZATRIPTAN BENZOATE 5 MG/1
5 TABLET, ORALLY DISINTEGRATING ORAL
Qty: 9 TABLET | Refills: 0 | Status: SHIPPED | OUTPATIENT
Start: 2022-06-20

## 2022-06-20 RX ORDER — PROPRANOLOL HCL 60 MG
60 CAPSULE, EXTENDED RELEASE 24HR ORAL DAILY
Qty: 30 CAPSULE | Refills: 2 | Status: SHIPPED | OUTPATIENT
Start: 2022-06-20

## 2022-06-20 NOTE — PATIENT INSTRUCTIONS
Take propranolol extended release 60 mg daily.  This is a prophylactic medication used to reduce frequency and severity of migraines.  It may take some weeks to really get the benefits of using the medication.      Take rizatriptan 5 mg as needed at the start of a migraine.  Can take again after 2 hours.  This is an abortive agent used to make a migraine go away. There is risk for vasospasm/vasoconstriction if used to often, so please follow the following guidelines:   - No more than 2 in a 24 hour period   - No more than 3-4 in a week if possible   - No more than 9-12 in a month

## 2022-06-20 NOTE — LETTER
6/20/2022         RE: Anthony Edwards  [address]  [place] [facility] [address]        Dear Colleague,    Thank you for referring your patient, Anthony Edwards, to the [facility]. Please see a copy of my visit note below.    [facility] Neurology Consultation    Anthony Edwards MRN# [id number]   Age: 26 year old YOB: 1995     Requesting physician: Sander Colon  [facility]     Reason for Consultation: headaches      History of Presenting Symptoms:   Anthony Edwards is a 26 year old male who presents today for evaluation of headaches.  The patient has a pertinent medical history of ADHD, MDD, and prior stimulant dependence (history of, stopped in 2016).      Upon chart review, the patient was seen with his PCP 8/15/2019 with ongoing migraines.  He had classic migraines for years with minor visual aura and some eye pain occurring prior to onset of head pain.  He had nausea, photophobia, and his symptoms were improved with rest or a dark room.  At the time, he was using caffeine daily, and was feeling depressed due to a loss in the family. He was given Imitrex for as needed therapy, and told to taper off of daily use of Excedrin and caffeine.    Today, the patient has had migraines since middle school.  He gets migraines 2-3 times a week, sometimes with a week of no migraines.  In total he has about 10-15 migraines a month.  A migraine is a pressure sensation with light sensitization over his whole head or bifrontal region. He has had a visual aura of lights (dots of lights going across vision) in the past, but this only happens during the migraine and not very often at that.  A migraine can last all day.  It is helped by sleep, and darkened areas.  The migraines haven't occurred at night or woken his up at night.  They aren't triggered by anything.  He is using Excedrin for each migraine. He reports trying Imitrex in 2019, but he felt off  when using it so he stopped (may have been an upset stomach).    No specific foods cause migraine.  He cut back on caffeine (still drinks 2-3 cans of Mountain dew a day, previously drank a 12 pack a day).  Varied sleep schedule (usually gets 8-9 hours, shifts from going to bed at 2-3am and waking at 12).   He has had 10-11 concussions (raced BMX, snowboarding, hit with baseball) in his life, he had an episode of LOC with a snowboarding event.  He felt that he doesn't recall his 11th grade year due to a concussion occurring with getting hit by a baseball.    About 9 months ago, the patient had an odd event of loosing track of where he was in space.  He forgot he was at home, and where his home was.  This occurred in the AM upon waking.  The event lasted a few seconds (20-30 seconds at most) and then returned to normal state.    Had asthma in his youth but no ongoing issues.     Social History:   Daily smoker.  Social alcohol use. Uses marijuana occasionally for migraine. Unemployed, has held a good deal of jobs.  High-school diploma.      Medications:   No ongoing medications     Physical Exam:   Vitals: /76 (BP Location: Right arm, Patient Position: Sitting, Cuff Size: Adult Regular)   Pulse 81    General: Seated comfortably in no acute distress.  HEENT: Neck supple with normal range of motion. No paracervical muscle tenderness or tightness.    Neurologic:     Mental Status: fluent speech, oriented and accurate historian.     Cranial Nerves: Visual fields intact. PERRL. EOMI with normal smooth pursuit. Facial sensation intact/symmetric. Facial movements symmetric. Hearing not formally tested but intact to conversation. Palate elevation symmetric, uvula midline. No dysarthria. Shoulder shrug strong bilaterally. Tongue protrusion midline.     Motor: No tremors or other abnormal movements observed. Muscle tone normal throughout. No pronator drift. Normal/symmetric rapid finger tapping. Strength 5/5 throughout  upper and lower extremities.     Deep Tendon Reflexes: 2+/symmetric throughout upper and lower extremities. No clonus.     Sensory: Intact/symmetric to light touch b/l upper and lower. Negative Romberg.      Coordination: Finger-nose-finger intact without dysmetria.      Gait: Normal, steady casual gait. Able to walk on toes, heels and tandem without difficulty.         Assessment and Plan:   Assessment:  Classic migraine w/out aura  Atypical event of altered awareness  Hx of concussions    The patient's migrainous headaches are occurring over 10-15 times in a month, and leading to some disability when it comes to obtaining work.  I feel he would benefit from a re-trial of another triptan such as rizatriptan given his prior imitrex use led to stomach issues.  He had pediatric asthma, but hasn't had any events as an adult.  I think he could trial a beta-blocker for a prophylactic agent given I think it will have lessened side-effects compared to Topamax, nortriptyline, and other AED.  Hopefull these two agents together can get his headaches down by 50% and less severe.      He does report multiple concussions, as well as a recent event of transient loss of orientation/awareness.  While I do not think his recent event is similar in presentation to a seizure, I do think his ongoing migraines/Hx of concussions give weight to obtaining both head imaging and an EEG to rule out multiple immediately treatable conditions.     The patient will continue to work on improving his sleep schedule and weaning off of caffeine intake.    Plan:  - Head CT w/wout contrast  - EEG routine  - Propranolol ER 60 mg every day  - Rizatriptan 5 mg   - take as needed with onset of migraine, can take another tab 2 hours after if migraine is not abated   - take no more than 9-12 tabs in one month    Follow up in Neurology clinic in 3 months or should new concerns arise.    BRENNA Lopez D.O.   of Neurology    Total time  today ( 52 min) in this patient encounter was spent on pre-charting, counseling and/or coordination of care. The patient is in agreement with this plan and has no further questions.        Again, thank you for allowing me to participate in the care of your patient.        Sincerely,        Dar Lopez, DO

## 2022-06-20 NOTE — PROGRESS NOTES
[facility] Neurology Consultation    Anthony Edwards MRN# [id number]   Age: 26 year old YOB: 1995     Requesting physician: Sander Fuentes, [facility]     Reason for Consultation: headaches      History of Presenting Symptoms:   Anthony Edwards is a 26 year old male who presents today for evaluation of headaches.  The patient has a pertinent medical history of ADHD, MDD, and prior stimulant dependence (history of, stopped in 2016).      Upon chart review, the patient was seen with his PCP 8/15/2019 with ongoing migraines.  He had classic migraines for years with minor visual aura and some eye pain occurring prior to onset of head pain.  He had nausea, photophobia, and his symptoms were improved with rest or a dark room.  At the time, he was using caffeine daily, and was feeling depressed due to a loss in the family. He was given Imitrex for as needed therapy, and told to taper off of daily use of Excedrin and caffeine.    Today, the patient has had migraines since middle school.  He gets migraines 2-3 times a week, sometimes with a week of no migraines.  In total he has about 10-15 migraines a month.  A migraine is a pressure sensation with light sensitization over his whole head or bifrontal region. He has had a visual aura of lights (dots of lights going across vision) in the past, but this only happens during the migraine and not very often at that.  A migraine can last all day.  It is helped by sleep, and darkened areas.  The migraines haven't occurred at night or woken his up at night.  They aren't triggered by anything.  He is using Excedrin for each migraine. He reports trying Imitrex in 2019, but he felt off when using it so he stopped (may have been an upset stomach).    No specific foods cause migraine.  He cut back on caffeine (still drinks 2-3 cans of Mountain dew a day, previously drank a 12 pack a day).  Varied sleep schedule (usually gets 8-9 hours, shifts from  going to bed at 2-3am and waking at 12).   He has had 10-11 concussions (raced BMX, snowboarding, hit with baseball) in his life, he had an episode of LOC with a snowboarding event.  He felt that he doesn't recall his 11th grade year due to a concussion occurring with getting hit by a baseball.    About 9 months ago, the patient had an odd event of loosing track of where he was in space.  He forgot he was at home, and where his home was.  This occurred in the AM upon waking.  The event lasted a few seconds (20-30 seconds at most) and then returned to normal state.    Had asthma in his youth but no ongoing issues.     Social History:   Daily smoker.  Social alcohol use. Uses marijuana occasionally for migraine. Unemployed, has held a good deal of jobs.  High-school diploma.      Medications:   No ongoing medications     Physical Exam:   Vitals: /76 (BP Location: Right arm, Patient Position: Sitting, Cuff Size: Adult Regular)   Pulse 81    General: Seated comfortably in no acute distress.  HEENT: Neck supple with normal range of motion. No paracervical muscle tenderness or tightness.    Neurologic:     Mental Status: fluent speech, oriented and accurate historian.     Cranial Nerves: Visual fields intact. PERRL. EOMI with normal smooth pursuit. Facial sensation intact/symmetric. Facial movements symmetric. Hearing not formally tested but intact to conversation. Palate elevation symmetric, uvula midline. No dysarthria. Shoulder shrug strong bilaterally. Tongue protrusion midline.     Motor: No tremors or other abnormal movements observed. Muscle tone normal throughout. No pronator drift. Normal/symmetric rapid finger tapping. Strength 5/5 throughout upper and lower extremities.     Deep Tendon Reflexes: 2+/symmetric throughout upper and lower extremities. No clonus.     Sensory: Intact/symmetric to light touch b/l upper and lower. Negative Romberg.      Coordination: Finger-nose-finger intact without dysmetria.       Gait: Normal, steady casual gait. Able to walk on toes, heels and tandem without difficulty.         Assessment and Plan:   Assessment:  Classic migraine w/out aura  Atypical event of altered awareness  Hx of concussions    The patient's migrainous headaches are occurring over 10-15 times in a month, and leading to some disability when it comes to obtaining work.  I feel he would benefit from a re-trial of another triptan such as rizatriptan given his prior imitrex use led to stomach issues.  He had pediatric asthma, but hasn't had any events as an adult.  I think he could trial a beta-blocker for a prophylactic agent given I think it will have lessened side-effects compared to Topamax, nortriptyline, and other AED.  Hopefull these two agents together can get his headaches down by 50% and less severe.      He does report multiple concussions, as well as a recent event of transient loss of orientation/awareness.  While I do not think his recent event is similar in presentation to a seizure, I do think his ongoing migraines/Hx of concussions give weight to obtaining both head imaging and an EEG to rule out multiple immediately treatable conditions.     The patient will continue to work on improving his sleep schedule and weaning off of caffeine intake.    Plan:  - Head CT w/wout contrast  - EEG routine  - Propranolol ER 60 mg every day  - Rizatriptan 5 mg   - take as needed with onset of migraine, can take another tab 2 hours after if migraine is not abated   - take no more than 9-12 tabs in one month    Follow up in Neurology clinic in 3 months or should new concerns arise.    BRENNA Lopez D.O.   of Neurology    Total time today ( 52 min) in this patient encounter was spent on pre-charting, counseling and/or coordination of care. The patient is in agreement with this plan and has no further questions.

## 2022-06-20 NOTE — NURSING NOTE
Chief Complaint   Patient presents with     Consult For     Migraines  Referred by Sander Colon MD

## 2022-06-21 ENCOUNTER — ANCILLARY PROCEDURE (OUTPATIENT)
Dept: NEUROLOGY | Facility: CLINIC | Age: 27
End: 2022-06-21
Attending: PSYCHIATRY & NEUROLOGY
Payer: COMMERCIAL

## 2022-06-21 DIAGNOSIS — R40.4 TRANSIENT ALTERATION OF AWARENESS: ICD-10-CM

## 2022-06-21 PROCEDURE — 95816 EEG AWAKE AND DROWSY: CPT | Performed by: PSYCHIATRY & NEUROLOGY

## 2022-06-25 ENCOUNTER — HOSPITAL ENCOUNTER (OUTPATIENT)
Dept: CT IMAGING | Facility: CLINIC | Age: 27
Discharge: HOME | End: 2022-06-25
Attending: PSYCHIATRY & NEUROLOGY | Admitting: PSYCHIATRY & NEUROLOGY
Payer: COMMERCIAL

## 2022-06-25 DIAGNOSIS — G43.909 MIGRAINE WITHOUT STATUS MIGRAINOSUS, NOT INTRACTABLE, UNSPECIFIED MIGRAINE TYPE: ICD-10-CM

## 2022-06-25 PROCEDURE — 70450 CT HEAD/BRAIN W/O DYE: CPT

## 2022-09-11 ENCOUNTER — HEALTH MAINTENANCE LETTER (OUTPATIENT)
Age: 27
End: 2022-09-11

## 2022-09-20 ENCOUNTER — VIRTUAL VISIT (OUTPATIENT)
Dept: NEUROLOGY | Facility: CLINIC | Age: 27
End: 2022-09-20
Payer: COMMERCIAL

## 2022-09-20 DIAGNOSIS — R69 DIAGNOSIS DEFERRED: Primary | ICD-10-CM

## 2022-09-20 PROCEDURE — 99207 PR NO BILLABLE SERVICE THIS VISIT: CPT | Performed by: PSYCHIATRY & NEUROLOGY

## 2022-09-20 NOTE — PROGRESS NOTES
Ap is a 27 year old who is being evaluated via a billable video visit.      How would you like to obtain your AVS? Mychart  If the video visit is dropped, the invitation should be resent by: [phone]  {If patient encounters technical issues they should call [phone] :[id number]}      Video-Visit Details    Video Start Time: {video visit start/end time for provider to select:112485}    Type of service:  Video Visit    Video End Time:{video visit start/end time for provider to select:[id number]}    Originating Location (pt. Location): Home    Distant Location (provider location):  [facility] NEUROLOGY [facility]     Platform used for Video Visit: GoGoPin

## 2022-09-20 NOTE — LETTER
"9/20/2022       RE: Anthony Edwards  [address]  [place] [address]     Dear Colleague,    Thank you for referring your patient, Anthony Edwards, to the [facility] NEUROLOGY CLINIC [facility]. Please see a copy of my visit note below.    [facility] Neurology Follow Up Visit    Anthony Edwards MRN# [id number]   Age: 27 year old YOB: 1995     Brief history of symptoms: The patient was initially seen in neurologic consultation on 6/20/2022 for evaluation of headaches. Please see the comprehensive neurologic consultation notes from those dates in the Epic records for details.     He has a prolonged history of migraine w/aura, occurring since middle school at 2-3 per week with a total of 10-15 a month.  He felt \"off\" in the past when trying Imitrex.  He was drinking 2-3 cans of mountain dew pr day (cut back from 12 pack per day).  Had multiple concussions, and had poor sleep schedule (fluctuating). On top of migraines, he had one event of 20-30 seconds of loosing track of time/space (forgot his home, where he was in his home).  He was to obtain imaging, EEG, and trial a daily prophylactic like propranolol with rizatriptan as an abortive.    Interval history:   - Head CT 6/25/2022 was normal (reviewed today by myself, I agree with interpretation).  - EEG 6/21/2022 was normal    Unable to contact patient.    BRENNA Lopez D.O.  [facility] Neurology          Again, thank you for allowing me to participate in the care of your patient.      Sincerely,    Dar Lopez, DO      "

## 2022-09-20 NOTE — LETTER
Date:September 20, 2022      Provider requested that no letter be sent. Do not send.       [facility]

## 2022-09-20 NOTE — PROGRESS NOTES
"[facility] Neurology Follow Up Visit    Anthony Edwards MRN# [id number]   Age: 27 year old YOB: 1995     Brief history of symptoms: The patient was initially seen in neurologic consultation on 6/20/2022 for evaluation of headaches. Please see the comprehensive neurologic consultation notes from those dates in the Epic records for details.     He has a prolonged history of migraine w/aura, occurring since middle school at 2-3 per week with a total of 10-15 a month.  He felt \"off\" in the past when trying Imitrex.  He was drinking 2-3 cans of mountain dew pr day (cut back from 12 pack per day).  Had multiple concussions, and had poor sleep schedule (fluctuating). On top of migraines, he had one event of 20-30 seconds of loosing track of time/space (forgot his home, where he was in his home).  He was to obtain imaging, EEG, and trial a daily prophylactic like propranolol with rizatriptan as an abortive.    Interval history:   - Head CT 6/25/2022 was normal (reviewed today by myself, I agree with interpretation).  - EEG 6/21/2022 was normal    Unable to contact patient.    BRENNA Lopez D.O.  [facility] Neurology      "

## 2022-12-11 NOTE — NURSING NOTE
"Chief Complaint   Patient presents with     Ear Problem       Initial /60 (BP Location: Right arm, Patient Position: Sitting, Cuff Size: Adult Large)   Pulse 71   Temp 97.9  F (36.6  C) (Tympanic)   Resp 20   Ht 1.778 m (5' 10\")   Wt 98 kg (216 lb)   SpO2 97%   BMI 30.99 kg/m   Estimated body mass index is 30.99 kg/m  as calculated from the following:    Height as of this encounter: 1.778 m (5' 10\").    Weight as of this encounter: 98 kg (216 lb).    Patient presents to the clinic using No DME    Health Maintenance that is potentially due pending provider review:  NONE    n/a    Is there anyone who you would like to be able to receive your results? No  If yes have patient fill out MIRNA    Grace Suero CMA    " 11-Dec-2022 13:41

## 2023-01-22 ENCOUNTER — HEALTH MAINTENANCE LETTER (OUTPATIENT)
Age: 28
End: 2023-01-22

## 2024-02-24 ENCOUNTER — HEALTH MAINTENANCE LETTER (OUTPATIENT)
Age: 29
End: 2024-02-24

## 2025-03-09 ENCOUNTER — HEALTH MAINTENANCE LETTER (OUTPATIENT)
Age: 30
End: 2025-03-09